# Patient Record
Sex: MALE | Race: BLACK OR AFRICAN AMERICAN | NOT HISPANIC OR LATINO | Employment: FULL TIME | URBAN - METROPOLITAN AREA
[De-identification: names, ages, dates, MRNs, and addresses within clinical notes are randomized per-mention and may not be internally consistent; named-entity substitution may affect disease eponyms.]

---

## 2019-04-21 ENCOUNTER — HOSPITAL ENCOUNTER (EMERGENCY)
Facility: HOSPITAL | Age: 24
Discharge: HOME | End: 2019-04-21
Attending: EMERGENCY MEDICINE | Admitting: EMERGENCY MEDICINE

## 2019-04-21 VITALS
WEIGHT: 300 LBS | TEMPERATURE: 99.1 F | DIASTOLIC BLOOD PRESSURE: 102 MMHG | RESPIRATION RATE: 18 BRPM | OXYGEN SATURATION: 97 % | HEIGHT: 75 IN | SYSTOLIC BLOOD PRESSURE: 156 MMHG | BODY MASS INDEX: 37.3 KG/M2 | HEART RATE: 103 BPM

## 2019-04-21 DIAGNOSIS — R11.2 NAUSEA AND VOMITING, INTRACTABILITY OF VOMITING NOT SPECIFIED, UNSPECIFIED VOMITING TYPE: Primary | ICD-10-CM

## 2019-04-21 DIAGNOSIS — R19.7 DIARRHEA, UNSPECIFIED TYPE: ICD-10-CM

## 2019-04-21 LAB
ALBUMIN SERPL-MCNC: 4 G/DL (ref 3.5–5.2)
ALBUMIN/GLOB SERPL: 1.1 G/DL
ALP SERPL-CCNC: 104 U/L (ref 39–117)
ALT SERPL W P-5'-P-CCNC: 27 U/L (ref 1–41)
AMPHET+METHAMPHET UR QL: NEGATIVE
AMPHETAMINES UR QL: NEGATIVE
ANION GAP SERPL CALCULATED.3IONS-SCNC: 13 MMOL/L
AST SERPL-CCNC: 24 U/L (ref 1–40)
BARBITURATES UR QL SCN: NEGATIVE
BASOPHILS # BLD AUTO: 0.01 10*3/MM3 (ref 0–0.2)
BASOPHILS NFR BLD AUTO: 0.2 % (ref 0–1.5)
BENZODIAZ UR QL SCN: NEGATIVE
BILIRUB SERPL-MCNC: 0.4 MG/DL (ref 0.2–1.2)
BILIRUB UR QL STRIP: NEGATIVE
BUN BLD-MCNC: 9 MG/DL (ref 6–20)
BUN/CREAT SERPL: 7.8 (ref 7–25)
BUPRENORPHINE SERPL-MCNC: NEGATIVE NG/ML
CALCIUM SPEC-SCNC: 9.4 MG/DL (ref 8.6–10.5)
CANNABINOIDS SERPL QL: POSITIVE
CHLORIDE SERPL-SCNC: 96 MMOL/L (ref 98–107)
CLARITY UR: CLEAR
CO2 SERPL-SCNC: 25 MMOL/L (ref 22–29)
COCAINE UR QL: NEGATIVE
COLOR UR: YELLOW
CREAT BLD-MCNC: 1.16 MG/DL (ref 0.76–1.27)
DEPRECATED RDW RBC AUTO: 41.4 FL (ref 37–54)
EOSINOPHIL # BLD AUTO: 0.02 10*3/MM3 (ref 0–0.4)
EOSINOPHIL NFR BLD AUTO: 0.3 % (ref 0.3–6.2)
ERYTHROCYTE [DISTWIDTH] IN BLOOD BY AUTOMATED COUNT: 13.2 % (ref 12.3–15.4)
GFR SERPL CREATININE-BSD FRML MDRD: 94 ML/MIN/1.73
GLOBULIN UR ELPH-MCNC: 3.8 GM/DL
GLUCOSE BLD-MCNC: 100 MG/DL (ref 65–99)
GLUCOSE UR STRIP-MCNC: NEGATIVE MG/DL
HCT VFR BLD AUTO: 46.3 % (ref 37.5–51)
HGB BLD-MCNC: 15.4 G/DL (ref 13–17.7)
HGB UR QL STRIP.AUTO: NEGATIVE
HOLD SPECIMEN: NORMAL
HOLD SPECIMEN: NORMAL
IMM GRANULOCYTES # BLD AUTO: 0.03 10*3/MM3 (ref 0–0.05)
IMM GRANULOCYTES NFR BLD AUTO: 0.5 % (ref 0–0.5)
KETONES UR QL STRIP: NEGATIVE
LEUKOCYTE ESTERASE UR QL STRIP.AUTO: NEGATIVE
LIPASE SERPL-CCNC: 102 U/L (ref 13–60)
LYMPHOCYTES # BLD AUTO: 1.06 10*3/MM3 (ref 0.7–3.1)
LYMPHOCYTES NFR BLD AUTO: 17.5 % (ref 19.6–45.3)
MCH RBC QN AUTO: 28.6 PG (ref 26.6–33)
MCHC RBC AUTO-ENTMCNC: 33.3 G/DL (ref 31.5–35.7)
MCV RBC AUTO: 86.1 FL (ref 79–97)
METHADONE UR QL SCN: NEGATIVE
MONOCYTES # BLD AUTO: 0.79 10*3/MM3 (ref 0.1–0.9)
MONOCYTES NFR BLD AUTO: 13.1 % (ref 5–12)
NEUTROPHILS # BLD AUTO: 4.16 10*3/MM3 (ref 1.7–7)
NEUTROPHILS NFR BLD AUTO: 68.9 % (ref 42.7–76)
NITRITE UR QL STRIP: NEGATIVE
OPIATES UR QL: NEGATIVE
OXYCODONE UR QL SCN: NEGATIVE
PCP UR QL SCN: NEGATIVE
PH UR STRIP.AUTO: 6 [PH] (ref 5–8)
PLATELET # BLD AUTO: 295 10*3/MM3 (ref 140–450)
PMV BLD AUTO: 9.5 FL (ref 6–12)
POTASSIUM BLD-SCNC: 4 MMOL/L (ref 3.5–5.2)
PROPOXYPH UR QL: NEGATIVE
PROT SERPL-MCNC: 7.8 G/DL (ref 6–8.5)
PROT UR QL STRIP: NEGATIVE
RBC # BLD AUTO: 5.38 10*6/MM3 (ref 4.14–5.8)
SODIUM BLD-SCNC: 134 MMOL/L (ref 136–145)
SP GR UR STRIP: 1.02 (ref 1–1.03)
TRICYCLICS UR QL SCN: NEGATIVE
UROBILINOGEN UR QL STRIP: NORMAL
WBC NRBC COR # BLD: 6.04 10*3/MM3 (ref 3.4–10.8)
WHOLE BLOOD HOLD SPECIMEN: NORMAL
WHOLE BLOOD HOLD SPECIMEN: NORMAL

## 2019-04-21 PROCEDURE — 99283 EMERGENCY DEPT VISIT LOW MDM: CPT

## 2019-04-21 PROCEDURE — 83690 ASSAY OF LIPASE: CPT

## 2019-04-21 PROCEDURE — 25010000002 ONDANSETRON PER 1 MG: Performed by: EMERGENCY MEDICINE

## 2019-04-21 PROCEDURE — 80053 COMPREHEN METABOLIC PANEL: CPT

## 2019-04-21 PROCEDURE — 80306 DRUG TEST PRSMV INSTRMNT: CPT | Performed by: PHYSICIAN ASSISTANT

## 2019-04-21 PROCEDURE — 85025 COMPLETE CBC W/AUTO DIFF WBC: CPT

## 2019-04-21 PROCEDURE — 96374 THER/PROPH/DIAG INJ IV PUSH: CPT

## 2019-04-21 PROCEDURE — 81003 URINALYSIS AUTO W/O SCOPE: CPT | Performed by: EMERGENCY MEDICINE

## 2019-04-21 RX ORDER — ONDANSETRON 2 MG/ML
4 INJECTION INTRAMUSCULAR; INTRAVENOUS ONCE
Status: COMPLETED | OUTPATIENT
Start: 2019-04-21 | End: 2019-04-21

## 2019-04-21 RX ORDER — ONDANSETRON 8 MG/1
8 TABLET, ORALLY DISINTEGRATING ORAL EVERY 8 HOURS PRN
Qty: 12 TABLET | Refills: 0 | Status: SHIPPED | OUTPATIENT
Start: 2019-04-21 | End: 2022-11-28

## 2019-04-21 RX ORDER — SODIUM CHLORIDE 0.9 % (FLUSH) 0.9 %
10 SYRINGE (ML) INJECTION AS NEEDED
Status: DISCONTINUED | OUTPATIENT
Start: 2019-04-21 | End: 2019-04-22 | Stop reason: HOSPADM

## 2019-04-21 RX ADMIN — SODIUM CHLORIDE 1000 ML: 9 INJECTION, SOLUTION INTRAVENOUS at 21:13

## 2019-04-21 RX ADMIN — ONDANSETRON 4 MG: 2 INJECTION INTRAMUSCULAR; INTRAVENOUS at 21:13

## 2022-11-28 ENCOUNTER — OFFICE VISIT (OUTPATIENT)
Dept: FAMILY MEDICINE CLINIC | Facility: CLINIC | Age: 27
End: 2022-11-28

## 2022-11-28 VITALS
HEIGHT: 78 IN | SYSTOLIC BLOOD PRESSURE: 158 MMHG | DIASTOLIC BLOOD PRESSURE: 72 MMHG | OXYGEN SATURATION: 99 % | BODY MASS INDEX: 36.45 KG/M2 | WEIGHT: 315 LBS | TEMPERATURE: 97.2 F | HEART RATE: 84 BPM

## 2022-11-28 DIAGNOSIS — I10 PRIMARY HYPERTENSION: ICD-10-CM

## 2022-11-28 DIAGNOSIS — G47.30 SLEEP APNEA, UNSPECIFIED TYPE: ICD-10-CM

## 2022-11-28 DIAGNOSIS — Z11.59 ENCOUNTER FOR HEPATITIS C SCREENING TEST FOR LOW RISK PATIENT: ICD-10-CM

## 2022-11-28 DIAGNOSIS — Z00.00 GENERAL MEDICAL EXAM: Primary | ICD-10-CM

## 2022-11-28 PROCEDURE — 2014F MENTAL STATUS ASSESS: CPT | Performed by: PHYSICIAN ASSISTANT

## 2022-11-28 PROCEDURE — 3008F BODY MASS INDEX DOCD: CPT | Performed by: PHYSICIAN ASSISTANT

## 2022-11-28 PROCEDURE — 99385 PREV VISIT NEW AGE 18-39: CPT | Performed by: PHYSICIAN ASSISTANT

## 2022-11-28 RX ORDER — AMLODIPINE BESYLATE 5 MG/1
5 TABLET ORAL DAILY
Qty: 30 TABLET | Refills: 5 | Status: SHIPPED | OUTPATIENT
Start: 2022-11-28 | End: 2023-01-18 | Stop reason: ALTCHOICE

## 2022-11-28 NOTE — PROGRESS NOTES
Subjective   Amy Silva is a 27 y.o. male  Establish Care (New patient establish care, previous pcp [facility]), Witnessed Apnea (Sleep apnea, req referral for a sleep study ), and Hypertension (Diagnosed with HTN, previously on amlodipine 5mg per pharm)      History of Present Illness  Patient presents today to establish care in our practice as a new patient and for a preventive medical visit.  Patient is here to determine screening labs and tests that are due and to determine immunization status as well.  Patient will be counseled regarding preventative medicine issues such as regular exercise and healthy diet as well.    Amy Silva, 1995, coming in to establish care in our practice for a physical.     He has not been taking his blood pressure medication because he has not been in a while. The patient has been told that he has sleep apnea for years. He states that he has decided to take it serious recently. The patient feels tired a lot but feels like he was getting a decent amount of sleep. The patient admits it has been a few years since he has had any blood work. He states that he does not wear glasses or contacts. The patient reports that his vision has not changed drastically. He does not get a lot of headaches with his blood pressure being up. The patient does not have any heart problems. He has never been told that he has asthma or any lung problems. The patient does not have a lot of trouble with sinuses, tonsils, sore throats, or sinus infections. He does not have a lot of digestive issues, bowel issues, or urinary problems. The patient drinks plenty of water.     He denies wearing glasses or contacts. The patient denies blurred vision, and denies drastic changes in his vision.     He denies cancer in his family.    The patient denies smoking cigarettes but does vape occasionally, but he is trying to stop that.      The following portions of the patient's history were reviewed and updated as  appropriate: allergies, current medications, past social history and problem list    Review of Systems   Constitutional: Positive for fatigue.   HENT: Negative.    Eyes: Negative.    Respiratory: Positive for apnea.    Cardiovascular: Negative.    Gastrointestinal: Negative.    Endocrine: Negative.    Genitourinary: Negative.    Musculoskeletal: Negative.    Skin: Negative.    Allergic/Immunologic: Negative.    Neurological: Negative.    Hematological: Negative.    Psychiatric/Behavioral: Negative.    All other systems reviewed and are negative.      Objective     Vitals:    11/28/22 1307   BP: 158/72   Pulse: 84   Temp: 97.2 °F (36.2 °C)   SpO2: 99%       Physical Exam  Vitals and nursing note reviewed.   Constitutional:       General: He is not in acute distress.     Appearance: Normal appearance. He is well-developed. He is obese. He is not ill-appearing, toxic-appearing or diaphoretic.      Comments: Obesity noted  BMI 45   Neck:      Thyroid: No thyromegaly.   Cardiovascular:      Rate and Rhythm: Normal rate and regular rhythm.      Heart sounds: Normal heart sounds. No murmur heard.  Pulmonary:      Effort: Pulmonary effort is normal. No respiratory distress.      Breath sounds: Normal breath sounds.   Abdominal:      Palpations: Abdomen is soft. There is no mass.      Tenderness: There is no abdominal tenderness.   Neurological:      Mental Status: He is alert and oriented to person, place, and time.   Psychiatric:         Mood and Affect: Mood normal.         Behavior: Behavior normal.         Thought Content: Thought content normal.         Judgment: Judgment normal.       Discussed preventative medicine issues with patient including regular exercise, healthy diet, stress reduction, adequate sleep and recommended age-appropriate screening studies.  Assessment & Plan     Diagnoses and all orders for this visit:    1. General medical exam (Primary)  -     Lipid Panel; Future  -     Basic metabolic panel;  Future  -     CBC (No Diff); Future  -     TSH; Future    2. Sleep apnea, unspecified type  -     Ambulatory Referral to Sleep Medicine    3. Primary hypertension    Other orders  -     amLODIPine (NORVASC) 5 MG tablet; Take 1 tablet by mouth Daily. For BP  Dispense: 30 tablet; Refill: 5     The patient's blood pressure is elevated today at 158 mmHg. He will restart amlodipine 5 mg daily. He will return in 2 weeks for a blood pressure check. We will refer him to sleep medicine for a sleep study. We will order labs to check his thyroid, blood count, glucose, kidney function, electrolytes, thyroid, and cholesterol.      Transcribed from ambient dictation for Kinga Dye PA-C by Deisy Blue.  11/28/22   14:53 EST    Patient or patient representative verbalized consent to the visit recording.  I have personally performed the services described in this document as transcribed by the above individual, and it is both accurate and complete.  Kinga Dye PA-C  11/29/2022  12:56 EST

## 2023-01-18 ENCOUNTER — OFFICE VISIT (OUTPATIENT)
Dept: FAMILY MEDICINE CLINIC | Facility: CLINIC | Age: 28
End: 2023-01-18
Payer: MEDICAID

## 2023-01-18 VITALS
HEIGHT: 78 IN | WEIGHT: 315 LBS | HEART RATE: 86 BPM | DIASTOLIC BLOOD PRESSURE: 92 MMHG | TEMPERATURE: 97.2 F | SYSTOLIC BLOOD PRESSURE: 166 MMHG | BODY MASS INDEX: 36.45 KG/M2 | OXYGEN SATURATION: 99 %

## 2023-01-18 DIAGNOSIS — F41.9 ANXIETY AND DEPRESSION: ICD-10-CM

## 2023-01-18 DIAGNOSIS — F32.A ANXIETY AND DEPRESSION: ICD-10-CM

## 2023-01-18 DIAGNOSIS — A60.02 HERPES GENITALIS IN MEN: ICD-10-CM

## 2023-01-18 DIAGNOSIS — I10 PRIMARY HYPERTENSION: Primary | ICD-10-CM

## 2023-01-18 PROCEDURE — 99214 OFFICE O/P EST MOD 30 MIN: CPT | Performed by: PHYSICIAN ASSISTANT

## 2023-01-18 RX ORDER — VALACYCLOVIR HYDROCHLORIDE 500 MG/1
500 TABLET, FILM COATED ORAL 2 TIMES DAILY
Qty: 10 TABLET | Refills: 11 | Status: SHIPPED | OUTPATIENT
Start: 2023-01-18 | End: 2023-03-20 | Stop reason: SDUPTHER

## 2023-01-18 RX ORDER — NIFEDIPINE 60 MG/1
60 TABLET, EXTENDED RELEASE ORAL DAILY
Qty: 30 TABLET | Refills: 1 | Status: SHIPPED | OUTPATIENT
Start: 2023-01-18 | End: 2023-02-14

## 2023-01-20 NOTE — PROGRESS NOTES
Chief Complaint  Sleeping Problem (NEW PATIENT )    Subjective     History of Present Illness:  Amy Silva is a 27 y.o. male with a history of hypertension.  He is referred by Dr. Mittal with internal medicine.  Patient has been told for some time that he has sleep apnea with witnessed episodes of apnea.  Per his questionnaire the patient reports snoring, witnessed apnea, and frequent awakening.  Typically the patient goes to bed at 12-1 AM waking at 6-7 AM on weekdays and weekends.  He estimates an average of 7 hours of sleep per night.  It takes him approximately 30 minutes to get to sleep.  Patient uses e-cigarettes, drinks alcohol 2-4 times per month, and used marijuana several years ago.  He drinks 1 cup of regular coffee daily. He ask about astral projection. He reports that he fell asleep, and when he was waking up he was trying to push himself up when he felt as though he saw himself in bed (such as a type of out of body experience).    Further details are as follows:    Boerne Scale is (out of 24): Total score: 4     Estimated average amount of sleep per night: 7  Number of times he wakes up at night: 3  Difficulty falling back asleep: yes  It usually takes 15-30 minutes to go to sleep.  He feels sleepy upon waking up: occasionally  Rotating or night shift work: no    Drowsiness/Sleepiness:  He exhibits the following:  excessive daytime sleepiness  excessive daytime fatigue    Snoring/Breathing:  He exhibits the following:  loud snoring, snores in all sleep positions, quits breathing at night and awakens with dry mouth    Head Injury:  He exhibits the following:  No    Reflux:  He describes the following:  None    Narcolepsy:  He exhibits the following:  feeling of paralysis while going to sleep or coming out of sleep-once a few weeks ago.    RLS/PLMs:  He describes the following:  none    Insomnia:  He describes the following:  frequent awakenings    Parasomnia:  He exhibits the  "following:  None    Weight:       01/23/23  0921   Weight: (!) 181 kg (398 lb)      Weight change in the last year:  consistent    The patient's relevant past medical, surgical, family, and social history reviewed and updated in Epic as appropriate.    Review of Systems   Constitutional: Negative.    HENT: Negative.    Eyes: Negative.    Respiratory: Positive for apnea.    Cardiovascular: Negative.    Gastrointestinal: Negative.    Endocrine: Negative.    Genitourinary: Negative.    Musculoskeletal: Positive for back pain.   Skin: Negative.    Allergic/Immunologic: Negative.    Neurological: Negative.    Hematological: Negative.    Psychiatric/Behavioral: Negative.    All other systems reviewed and are negative.      PMH:    Past Medical History:   Diagnosis Date   • Hypertension    • Sleep apnea      Past Surgical History:   Procedure Laterality Date   • CHEST TUBE INSERTION         No Known Allergies    MEDS:  Prior to Admission medications    Medication Sig Start Date End Date Taking? Authorizing Provider   NIFEdipine XL (Procardia XL) 60 MG 24 hr tablet Take 1 tablet by mouth Daily. 1/18/23   Kinga Dye PA-C   sertraline (Zoloft) 50 MG tablet Take 1 tablet by mouth Daily. For depression and anxiety 1/18/23   Kinga Dye PA-C   valACYclovir (Valtrex) 500 MG tablet Take 1 tablet by mouth 2 (Two) Times a Day. As needed for outbreaks 1/18/23   Kinga Dye PA-C       FH:  History reviewed. No pertinent family history.    Objective   Vital Signs:  /90   Pulse 111   Temp 98.7 °F (37.1 °C) (Infrared)   Ht 198.1 cm (78\")   Wt (!) 181 kg (398 lb)   SpO2 94% Comment: RESTING ROOM AIR  BMI 45.99 kg/m²     Class 3 Severe Obesity (BMI >=40). Obesity-related health conditions include the following: obstructive sleep apnea. Obesity is improving with lifestyle modifications. BMI is is above average; BMI management plan is completed. We discussed portion control and increasing " exercise.        Physical Exam  Vitals reviewed.   Constitutional:       Appearance: Normal appearance.   HENT:      Head: Normocephalic and atraumatic.      Nose: Nose normal.      Mouth/Throat:      Mouth: Mucous membranes are moist.   Cardiovascular:      Rate and Rhythm: Normal rate and regular rhythm.      Heart sounds: No murmur heard.    No friction rub. No gallop.   Pulmonary:      Effort: Pulmonary effort is normal. No respiratory distress.      Breath sounds: Normal breath sounds. No wheezing or rhonchi.   Neurological:      Mental Status: He is alert and oriented to person, place, and time.   Psychiatric:         Behavior: Behavior normal.       Mallampati Score: III (soft and hard palate and base of uvula visible)    Result Review :              Assessment and Plan  Amy Silva is a 27 y.o. male who presents for further evaluation of excessive daytime and sleepiness and fatigue, nonrestorative sleep, and concerns for sleep disordered breathing and obstructive sleep apnea.  We will obtain a home sleep test for further evaluation.  The patient will return for follow-up and recommendations after test.  I have discussed weight loss as it pertains to obstructive sleep apnea.    Diagnoses and all orders for this visit:    1. Snoring (Primary)  -     Home Sleep Study; Future    2. Suspected sleep apnea  -     Home Sleep Study; Future    3. Excessive daytime sleepiness  -     Home Sleep Study; Future                 I discussed the consequences of uncontrolled sleep apnea including hypertension, heart disease, diabetes, stroke, and dementia. I further discussed sleep apnea therapeutic options including CPAP, Weight loss, Oral dental appliance, and surgery.    I spent 33 minutes caring for Amy on this date of service. This time includes time spent by me in the following activities: preparing for the visit, obtaining and/or reviewing a separately obtained history, performing a medically appropriate examination  and/or evaluation, counseling and educating the patient/family/caregiver, ordering medications, tests, or procedures and documenting information in the medical record.      Follow Up  Return for Follow up after study.  Patient was given instructions and counseling regarding his condition or for health maintenance advice. Please see specific information pulled into the AVS if appropriate.     NABIL Scott, ACNP-BC  Pulmonology, Critical Care, and Sleep Medicine

## 2023-01-23 ENCOUNTER — OFFICE VISIT (OUTPATIENT)
Dept: SLEEP MEDICINE | Facility: CLINIC | Age: 28
End: 2023-01-23
Payer: MEDICAID

## 2023-01-23 VITALS
SYSTOLIC BLOOD PRESSURE: 148 MMHG | DIASTOLIC BLOOD PRESSURE: 90 MMHG | HEIGHT: 78 IN | TEMPERATURE: 98.7 F | BODY MASS INDEX: 36.45 KG/M2 | OXYGEN SATURATION: 94 % | HEART RATE: 111 BPM | WEIGHT: 315 LBS

## 2023-01-23 DIAGNOSIS — R06.83 SNORING: Primary | ICD-10-CM

## 2023-01-23 DIAGNOSIS — R29.818 SUSPECTED SLEEP APNEA: ICD-10-CM

## 2023-01-23 DIAGNOSIS — G47.19 EXCESSIVE DAYTIME SLEEPINESS: ICD-10-CM

## 2023-01-23 PROCEDURE — 99203 OFFICE O/P NEW LOW 30 MIN: CPT | Performed by: NURSE PRACTITIONER

## 2023-02-14 RX ORDER — NIFEDIPINE 60 MG/1
TABLET, EXTENDED RELEASE ORAL
Qty: 30 TABLET | Refills: 1 | Status: SHIPPED | OUTPATIENT
Start: 2023-02-14 | End: 2023-03-01 | Stop reason: DRUGHIGH

## 2023-03-01 ENCOUNTER — OFFICE VISIT (OUTPATIENT)
Dept: FAMILY MEDICINE CLINIC | Facility: CLINIC | Age: 28
End: 2023-03-01
Payer: MEDICAID

## 2023-03-01 VITALS
HEIGHT: 78 IN | OXYGEN SATURATION: 99 % | SYSTOLIC BLOOD PRESSURE: 162 MMHG | WEIGHT: 315 LBS | HEART RATE: 110 BPM | DIASTOLIC BLOOD PRESSURE: 90 MMHG | BODY MASS INDEX: 36.45 KG/M2 | TEMPERATURE: 97.9 F

## 2023-03-01 DIAGNOSIS — F32.A ANXIETY AND DEPRESSION: ICD-10-CM

## 2023-03-01 DIAGNOSIS — F41.9 ANXIETY AND DEPRESSION: ICD-10-CM

## 2023-03-01 DIAGNOSIS — I10 PRIMARY HYPERTENSION: Primary | ICD-10-CM

## 2023-03-01 PROCEDURE — 99213 OFFICE O/P EST LOW 20 MIN: CPT | Performed by: PHYSICIAN ASSISTANT

## 2023-03-01 RX ORDER — NIFEDIPINE 90 MG/1
90 TABLET, EXTENDED RELEASE ORAL DAILY
Qty: 30 TABLET | Refills: 1 | Status: SHIPPED | OUTPATIENT
Start: 2023-03-01 | End: 2023-03-20 | Stop reason: SDUPTHER

## 2023-03-01 NOTE — PROGRESS NOTES
Subjective   Amy Silva is a 27 y.o. male  Anxiety (Follow up on anxiety/depression, doing well on the medication ) and Hypertension (Follow up on blood pressure)      History of Present Illness    The patient is a 27-year-old male, coming in today for follow-up on hypertension, as well as anxiety and depression. Please see previous office notes from 01/23/2023.    The patient had a rough day yesterday, 02/28/2023; his blood pressure was a little high. He was really stressed out yesterday, 02/31/2023, and had some family issues. The patient denies any side effects from the blood pressure medication. He has not checked his blood pressure since his last visit. The patient denies any angina or palpitations. He had a headache yesterday, 02/28/2023.    Zoloft is helping some; his mind has been clearer, but has been racing.    He was contacted by sleep medicine and is waiting for an appointment to do the study.    The following portions of the patient's history were reviewed and updated as appropriate: allergies, current medications, past social history and problem list    Review of Systems   Constitutional: Negative for appetite change, fatigue and unexpected weight change.   Respiratory: Negative for cough, chest tightness and shortness of breath.    Cardiovascular: Negative for chest pain, palpitations and leg swelling.   Gastrointestinal: Negative for abdominal pain, diarrhea and nausea.   Skin: Negative for color change and rash.   Neurological: Negative for dizziness, tremors, syncope, weakness, light-headedness and headaches.   Psychiatric/Behavioral: Positive for dysphoric mood. Negative for agitation, behavioral problems, confusion, decreased concentration, hallucinations, self-injury, sleep disturbance and suicidal ideas. The patient is nervous/anxious. The patient is not hyperactive.         Improved on zoloft       Objective     Vitals:    03/01/23 0913   BP: 162/90   Pulse: 110   Temp: 97.9 °F (36.6 °C)    SpO2: 99%       Physical Exam  Vitals and nursing note reviewed.   Constitutional:       General: He is not in acute distress.     Appearance: Normal appearance. He is well-developed. He is obese. He is not ill-appearing, toxic-appearing or diaphoretic.      Comments: BMI45   Neck:      Vascular: No carotid bruit or JVD.   Cardiovascular:      Rate and Rhythm: Normal rate and regular rhythm.      Pulses: Normal pulses.      Heart sounds: Normal heart sounds. No murmur heard.  Pulmonary:      Effort: Pulmonary effort is normal. No respiratory distress.      Breath sounds: Normal breath sounds.   Abdominal:      Palpations: Abdomen is soft.      Tenderness: There is no abdominal tenderness.   Skin:     General: Skin is warm and dry.   Neurological:      Mental Status: He is alert and oriented to person, place, and time.   Psychiatric:         Mood and Affect: Mood normal.         Behavior: Behavior normal.         Thought Content: Thought content normal.         Judgment: Judgment normal.         Assessment & Plan     Diagnoses and all orders for this visit:    1. Primary hypertension (Primary)    2. Anxiety and depression    Other orders  -     sertraline (Zoloft) 50 MG tablet; Take 1 tablet by mouth Daily. For depression and anxiety  Dispense: 30 tablet; Refill: 11  -     NIFEdipine XL (PROCARDIA XL) 90 MG 24 hr tablet; Take 1 tablet by mouth Daily. For BP, new dose  Dispense: 30 tablet; Refill: 1    1. Hypertension  - We will increase the patient's blood pressure medication to 90 mg daily.  - He will continue to monitor his blood pressure at home once a week.Fu in office in one month    2. Anxiety and depression  - The patient will continue to take Zoloft as prescribed.    3. Herpes simplex  - The patient will continue to take valacyclovir as needed.    Answers for HPI/ROS submitted by the patient on 2/23/2023  What is the primary reason for your visit?: Other  Please describe your symptoms.: Check up  Have you  had these symptoms before?: Yes  How long have you been having these symptoms?: 1-4 days    Transcribed from ambient dictation for Kinga Dye PA-C by Deisy Blue.  03/01/23   10:46 EST    Patient or patient representative verbalized consent to the visit recording.  I have personally performed the services described in this document as transcribed by the above individual, and it is both accurate and complete.  Kinga Dye PA-C  3/1/2023  12:56 EST

## 2023-03-20 RX ORDER — VALACYCLOVIR HYDROCHLORIDE 500 MG/1
500 TABLET, FILM COATED ORAL 2 TIMES DAILY
Qty: 10 TABLET | Refills: 11 | Status: SHIPPED | OUTPATIENT
Start: 2023-03-20

## 2023-03-20 RX ORDER — NIFEDIPINE 90 MG/1
90 TABLET, EXTENDED RELEASE ORAL DAILY
Qty: 30 TABLET | Refills: 1 | Status: SHIPPED | OUTPATIENT
Start: 2023-03-20

## 2023-05-19 ENCOUNTER — OFFICE VISIT (OUTPATIENT)
Dept: FAMILY MEDICINE CLINIC | Facility: CLINIC | Age: 28
End: 2023-05-19
Payer: MEDICAID

## 2023-05-19 VITALS
SYSTOLIC BLOOD PRESSURE: 168 MMHG | WEIGHT: 315 LBS | BODY MASS INDEX: 36.45 KG/M2 | TEMPERATURE: 98 F | OXYGEN SATURATION: 96 % | DIASTOLIC BLOOD PRESSURE: 98 MMHG | HEIGHT: 78 IN | HEART RATE: 99 BPM

## 2023-05-19 DIAGNOSIS — E66.01 CLASS 3 SEVERE OBESITY WITH SERIOUS COMORBIDITY AND BODY MASS INDEX (BMI) OF 40.0 TO 44.9 IN ADULT, UNSPECIFIED OBESITY TYPE: ICD-10-CM

## 2023-05-19 DIAGNOSIS — R20.0 NUMBNESS OF RIGHT THUMB: ICD-10-CM

## 2023-05-19 DIAGNOSIS — J06.9 ACUTE URI: ICD-10-CM

## 2023-05-19 DIAGNOSIS — I10 PRIMARY HYPERTENSION: Primary | ICD-10-CM

## 2023-05-19 PROCEDURE — 1159F MED LIST DOCD IN RCRD: CPT | Performed by: PHYSICIAN ASSISTANT

## 2023-05-19 PROCEDURE — 99214 OFFICE O/P EST MOD 30 MIN: CPT | Performed by: PHYSICIAN ASSISTANT

## 2023-05-19 PROCEDURE — 1160F RVW MEDS BY RX/DR IN RCRD: CPT | Performed by: PHYSICIAN ASSISTANT

## 2023-05-19 RX ORDER — NIFEDIPINE 90 MG/1
90 TABLET, EXTENDED RELEASE ORAL DAILY
Qty: 30 TABLET | Refills: 1 | Status: SHIPPED | OUTPATIENT
Start: 2023-05-19

## 2023-05-19 RX ORDER — LEVOCETIRIZINE DIHYDROCHLORIDE 5 MG/1
5 TABLET, FILM COATED ORAL EVERY EVENING
Qty: 15 TABLET | Refills: 0 | Status: SHIPPED | OUTPATIENT
Start: 2023-05-19

## 2023-05-19 RX ORDER — AMOXICILLIN 500 MG/1
500 CAPSULE ORAL 2 TIMES DAILY
Qty: 14 CAPSULE | Refills: 0 | Status: SHIPPED | OUTPATIENT
Start: 2023-05-19

## 2023-05-19 RX ORDER — DEXTROMETHORPHAN HYDROBROMIDE AND PROMETHAZINE HYDROCHLORIDE 15; 6.25 MG/5ML; MG/5ML
5 SYRUP ORAL 4 TIMES DAILY PRN
Qty: 180 ML | Refills: 0 | Status: SHIPPED | OUTPATIENT
Start: 2023-05-19

## 2023-05-19 NOTE — PROGRESS NOTES
Subjective   Amy Silva is a 27 y.o. male  Hypertension (Here for 2 mo f/u), Apnea (NEEDS TO REDO), and URI (FEELS LIKE HE MIGHT HAVE A URI)      History of Present Illness    The patient is a 27-year-old male seen today for follow-up on hypertension, apnea, and new onset symptoms of congestion, cough, and cold.    The patient reports that he has been out of his hypertension medication for a few days. He has not checked his blood pressure readings while he is on his medication. The patient has been a little stressed out the last couple of days. He last took his blood pressure medication on Monday, 05/15/2022. The patient denies having a blood pressure cuff at home.    The patient had a sleep study performed last week. He was told that the result was insufficient data and recording time.    The patient feels like he has an upper respiratory infection. The patient reports that it is hard to breathe through his nose. He has been coughing up dark mucus. The patient reports that he is experiencing a scratchy throat when he wakes up. He denies any fever. The patient denies any allergies. He denies any pneumonia or bronchitis. The patient reports that he is not allergic to any antibiotics.    The patient reports that his aunt is on Ozempic, and it is really helping her to lose weight. He is aware that he needs to lose weight. The patient reports that he has not had blood work performed in a long time. He states that he has never been told that he has high blood sugar in the past. The patient denies any family history of diabetes.    The patient reports that he has been experiencing numbness in his right thumb for a few days. He denies any injury or trauma. The patient denies any swelling. 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    The following portions of the patient's history were reviewed and updated as appropriate: allergies, current medications, past social history and problem list    Review of Systems    Constitutional: Negative for fatigue and unexpected weight change.   HENT: Positive for congestion.    Respiratory: Positive for apnea and cough. Negative for chest tightness and shortness of breath.    Cardiovascular: Negative for chest pain, palpitations and leg swelling.   Gastrointestinal: Negative for nausea.   Skin: Negative for color change and rash.   Neurological: Positive for numbness. Negative for dizziness, syncope, weakness and headaches.       Objective     Vitals:    05/19/23 1145   BP: 168/98   Pulse: 99   Temp: 98 °F (36.7 °C)   SpO2: 96%       Physical Exam  Vitals and nursing note reviewed.   Constitutional:       General: He is not in acute distress.     Appearance: Normal appearance. He is well-developed. He is obese. He is not ill-appearing, toxic-appearing or diaphoretic.      Comments: BMI45   Neck:      Vascular: No carotid bruit or JVD.   Cardiovascular:      Rate and Rhythm: Normal rate and regular rhythm.      Pulses: Normal pulses.      Heart sounds: Normal heart sounds. No murmur heard.  Pulmonary:      Effort: Pulmonary effort is normal. No respiratory distress.      Breath sounds: Normal breath sounds.   Abdominal:      Palpations: Abdomen is soft.      Tenderness: There is no abdominal tenderness.   Musculoskeletal:         General: No swelling or tenderness. Normal range of motion.   Skin:     General: Skin is warm and dry.      Findings: No rash.   Neurological:      Mental Status: He is alert.      Sensory: No sensory deficit.         Assessment & Plan     Diagnoses and all orders for this visit:    1. Primary hypertension (Primary)  -     Hemoglobin A1c; Future  -     Basic metabolic panel; Future  -     TSH; Future    2. Class 3 severe obesity with serious comorbidity and body mass index (BMI) of 40.0 to 44.9 in adult, unspecified obesity type  -     Hemoglobin A1c; Future  -     Basic metabolic panel; Future  -     TSH; Future    3. Acute URI    4. Numbness of right  thumb    Other orders  -     NIFEdipine XL (PROCARDIA XL) 90 MG 24 hr tablet; Take 1 tablet by mouth Daily. For BP, new dose  Dispense: 30 tablet; Refill: 1  -     amoxicillin (AMOXIL) 500 MG capsule; Take 1 capsule by mouth 2 (Two) Times a Day.  Dispense: 14 capsule; Refill: 0  -     promethazine-dextromethorphan (PROMETHAZINE-DM) 6.25-15 MG/5ML syrup; Take 5 mL by mouth 4 (Four) Times a Day As Needed for Cough.  Dispense: 180 mL; Refill: 0  -     levocetirizine (XYZAL) 5 MG tablet; Take 1 tablet by mouth Every Evening. FOR CONGESTION  Dispense: 15 tablet; Refill: 0     1. Hypertension  The patient will begin taking his blood pressure medication again.  He will purchase a blood pressure cuff and begin monitoring his blood pressure at home.  He will call/message or return in a week with results if medication is working effectively.    2. Sleep apnea  The patient will contact [facility].    3. Upper respiratory infection  The patient will be prescribed an antibiotic, congestion medication, and cough syrup.    4. Right thumb numbness  The patient will apply a cool compress to the area twice daily.    Transcribed from ambient dictation for Kinga Dye PA-C by Vanna Collazo.  05/19/23   14:27 EDT    Patient or patient representative verbalized consent to the visit recording.  I have personally performed the services described in this document as transcribed by the above individual, and it is both accurate and complete.  Kinga Dye PA-C  5/19/2023  15:24 EDT

## 2023-05-26 RX ORDER — BENZONATATE 200 MG/1
200 CAPSULE ORAL 3 TIMES DAILY PRN
Qty: 21 CAPSULE | Refills: 0 | Status: SHIPPED | OUTPATIENT
Start: 2023-05-26

## 2023-05-30 DIAGNOSIS — R29.818 SUSPECTED SLEEP APNEA: ICD-10-CM

## 2023-05-30 DIAGNOSIS — G47.19 EXCESSIVE DAYTIME SLEEPINESS: ICD-10-CM

## 2023-05-30 DIAGNOSIS — R06.83 SNORING: Primary | ICD-10-CM

## 2023-05-30 RX ORDER — LEVOCETIRIZINE DIHYDROCHLORIDE 5 MG/1
5 TABLET, FILM COATED ORAL EVERY EVENING
Qty: 30 TABLET | Refills: 5 | Status: SHIPPED | OUTPATIENT
Start: 2023-05-30

## 2023-06-12 RX ORDER — NIFEDIPINE 90 MG/1
TABLET, EXTENDED RELEASE ORAL
Qty: 30 TABLET | Refills: 5 | Status: SHIPPED | OUTPATIENT
Start: 2023-06-12

## 2023-08-04 RX ORDER — VALACYCLOVIR HYDROCHLORIDE 500 MG/1
500 TABLET, FILM COATED ORAL 2 TIMES DAILY
Qty: 10 TABLET | Refills: 11 | Status: SHIPPED | OUTPATIENT
Start: 2023-08-04

## 2023-08-04 RX ORDER — NIFEDIPINE 90 MG/1
90 TABLET, EXTENDED RELEASE ORAL DAILY
Qty: 30 TABLET | Refills: 5 | Status: SHIPPED | OUTPATIENT
Start: 2023-08-04

## 2023-11-20 RX ORDER — VALACYCLOVIR HYDROCHLORIDE 500 MG/1
500 TABLET, FILM COATED ORAL 2 TIMES DAILY
Qty: 10 TABLET | Refills: 11 | Status: SHIPPED | OUTPATIENT
Start: 2023-11-20

## 2024-01-03 RX ORDER — VALACYCLOVIR HYDROCHLORIDE 500 MG/1
500 TABLET, FILM COATED ORAL 2 TIMES DAILY
Qty: 10 TABLET | Refills: 11 | Status: SHIPPED | OUTPATIENT
Start: 2024-01-03

## 2024-01-03 RX ORDER — NIFEDIPINE 90 MG/1
90 TABLET, EXTENDED RELEASE ORAL DAILY
Qty: 30 TABLET | Refills: 5 | Status: SHIPPED | OUTPATIENT
Start: 2024-01-03

## 2024-01-16 ENCOUNTER — OFFICE VISIT (OUTPATIENT)
Dept: FAMILY MEDICINE CLINIC | Facility: CLINIC | Age: 29
End: 2024-01-16
Payer: MEDICAID

## 2024-01-16 VITALS
HEIGHT: 78 IN | OXYGEN SATURATION: 98 % | TEMPERATURE: 97.8 F | SYSTOLIC BLOOD PRESSURE: 158 MMHG | DIASTOLIC BLOOD PRESSURE: 90 MMHG | BODY MASS INDEX: 36.45 KG/M2 | WEIGHT: 315 LBS | HEART RATE: 84 BPM

## 2024-01-16 DIAGNOSIS — G44.52 NEW DAILY PERSISTENT HEADACHE: ICD-10-CM

## 2024-01-16 DIAGNOSIS — Z00.00 GENERAL MEDICAL EXAM: Primary | ICD-10-CM

## 2024-01-16 DIAGNOSIS — I10 PRIMARY HYPERTENSION: ICD-10-CM

## 2024-01-16 DIAGNOSIS — G47.39 SLEEP APNEA-LIKE BEHAVIOR: ICD-10-CM

## 2024-01-16 PROCEDURE — 1159F MED LIST DOCD IN RCRD: CPT | Performed by: PHYSICIAN ASSISTANT

## 2024-01-16 PROCEDURE — 99395 PREV VISIT EST AGE 18-39: CPT | Performed by: PHYSICIAN ASSISTANT

## 2024-01-16 PROCEDURE — 1160F RVW MEDS BY RX/DR IN RCRD: CPT | Performed by: PHYSICIAN ASSISTANT

## 2024-01-16 RX ORDER — NIFEDIPINE 90 MG/1
90 TABLET, EXTENDED RELEASE ORAL DAILY
Qty: 30 TABLET | Refills: 5 | Status: SHIPPED | OUTPATIENT
Start: 2024-01-16

## 2024-01-16 RX ORDER — DOXAZOSIN MESYLATE 1 MG/1
1 TABLET ORAL NIGHTLY
Qty: 30 TABLET | Refills: 5 | Status: SHIPPED | OUTPATIENT
Start: 2024-01-16

## 2024-01-16 NOTE — PROGRESS NOTES
Subjective   Amy Silva is a 28 y.o. male  Annual Exam (Annual physical, not fasting), Obesity (Discuss weight loss options ), and Hypertension (Follow up on BP, often getting headaches in the back of head )      History of Present Illness  Patient presents today for a preventive medical visit.  Patient is here to determine screening labs and tests that are due and to determine immunization status as well.  Patient will be counseled regarding preventative medicine issues such as regular exercise and healthy diet as well.  The following portions of the patient's history were reviewed and updated as appropriate: allergies, current medications, past social history and problem list    Review of Systems   Constitutional: Negative.  Negative for fatigue and unexpected weight change.   HENT:  Negative for congestion, dental problem, postnasal drip, sinus pressure and sore throat.    Eyes: Negative.  Negative for photophobia, pain and visual disturbance.   Respiratory: Negative.     Cardiovascular: Negative.    Gastrointestinal: Negative.  Negative for nausea and vomiting.   Endocrine: Negative.    Genitourinary: Negative.    Musculoskeletal: Negative.    Skin: Negative.    Allergic/Immunologic: Negative.    Neurological:  Positive for headaches. Negative for dizziness, syncope, facial asymmetry, speech difficulty, weakness, light-headedness and numbness.   Hematological: Negative.    Psychiatric/Behavioral: Negative.  Negative for agitation, confusion, dysphoric mood and sleep disturbance. The patient is not nervous/anxious.    All other systems reviewed and are negative.      Objective     Vitals:    01/16/24 0933   BP: 158/90   Pulse: 84   Temp: 97.8 °F (36.6 °C)   SpO2: 98%       Physical Exam  Vitals and nursing note reviewed.   Constitutional:       General: He is not in acute distress.     Appearance: Normal appearance. He is well-developed. He is obese. He is not ill-appearing, toxic-appearing or diaphoretic.       Comments: BMI44   HENT:      Head: Normocephalic and atraumatic.      Right Ear: External ear normal.      Left Ear: External ear normal.   Eyes:      Conjunctiva/sclera: Conjunctivae normal.      Pupils: Pupils are equal, round, and reactive to light.   Neck:      Thyroid: No thyromegaly.      Vascular: No carotid bruit.   Cardiovascular:      Rate and Rhythm: Normal rate and regular rhythm.      Pulses: Normal pulses.      Heart sounds: Normal heart sounds. No murmur heard.  Pulmonary:      Effort: Pulmonary effort is normal. No respiratory distress.      Breath sounds: Normal breath sounds.   Abdominal:      General: Bowel sounds are normal.      Palpations: Abdomen is soft. There is no mass.      Tenderness: There is no abdominal tenderness.   Musculoskeletal:         General: No swelling. Normal range of motion.      Cervical back: Normal range of motion and neck supple.   Lymphadenopathy:      Cervical: No cervical adenopathy.   Skin:     General: Skin is warm and dry.      Findings: No lesion or rash.   Neurological:      Mental Status: He is alert and oriented to person, place, and time.      Cranial Nerves: No cranial nerve deficit.      Sensory: No sensory deficit.      Motor: No weakness.      Coordination: Coordination normal.      Gait: Gait normal.      Deep Tendon Reflexes: Reflexes are normal and symmetric.   Psychiatric:         Mood and Affect: Mood normal.         Behavior: Behavior normal.         Thought Content: Thought content normal.         Judgment: Judgment normal.       Discussed preventative medicine issues with patient including regular exercise, healthy diet, stress reduction, adequate sleep and recommended age-appropriate screening studies.  Assessment & Plan     Diagnoses and all orders for this visit:    1. General medical exam (Primary)  -     Hepatitis C Antibody; Future  -     CBC (No Diff); Future  -     Comprehensive metabolic panel; Future  -     Lipid Panel; Future  -      TSH; Future    2. Primary hypertension  -     Hepatitis C Antibody; Future  -     CBC (No Diff); Future  -     Comprehensive metabolic panel; Future  -     Lipid Panel; Future  -     TSH; Future    3. New daily persistent headache  -     Hepatitis C Antibody; Future  -     CBC (No Diff); Future  -     Comprehensive metabolic panel; Future  -     Lipid Panel; Future  -     TSH; Future    4. Sleep apnea-like behavior    Other orders  -     doxazosin (Cardura) 1 MG tablet; Take 1 tablet by mouth Every Night. For BP  Dispense: 30 tablet; Refill: 5  -     NIFEdipine XL (PROCARDIA XL) 90 MG 24 hr tablet; Take 1 tablet by mouth Daily. for blood pressure  Dispense: 30 tablet; Refill: 5

## 2024-01-16 NOTE — PROGRESS NOTES
Subjective   Amy Silva is a 28 y.o. male      Amy Silva, date of birth 1995, presents today for follow-up on headaches.    He conveys that he is having headaches. His blood pressure today 2024, is 158/90 mmHg. He was assessed for sleep apnea, and it was inconclusive. He was supposed to do a sleep lab study, but he had to cancel because he had to attend a .     He has lost 17 pounds since 2023. He has been trying to eat better. He has never seen a dietitian before. He denies any trouble urinating or blood in his urine. He denies any digestion, bowel issues, vision changes, or any swelling. He conveys that he is drinking plenty of water. He drinks coffee with a sugar free creamer. His diet consists of meat, vegetables, and fruit. He has been slowing down on bread and pasta. He conveys that he is doing well on the sertraline for his mood.    He denies any family history of diabetes or dialysis.         Annual Exam (Annual physical, not fasting), Obesity (Discuss weight loss options ), and Hypertension (Follow up on BP, often getting headaches in the back of head )      Obesity  Associated symptoms include headaches. Pertinent negatives include no congestion, fatigue, nausea, numbness, sore throat, vomiting or weakness.   Hypertension  Associated symptoms include headaches.     Patient presents today for a preventive medical visit.  Patient is here to determine screening labs and tests that are due and to determine immunization status as well.  Patient will be counseled regarding preventative medicine issues such as regular exercise and healthy diet as well.  The following portions of the patient's history were reviewed and updated as appropriate: allergies, current medications, past social history and problem list    Review of Systems   Constitutional: Negative.  Negative for fatigue and unexpected weight change.   HENT:  Negative for congestion, dental problem, postnasal drip, sinus  pressure and sore throat.    Eyes: Negative.  Negative for photophobia, pain and visual disturbance.   Respiratory: Negative.     Cardiovascular: Negative.    Gastrointestinal: Negative.  Negative for nausea and vomiting.   Endocrine: Negative.    Genitourinary: Negative.    Musculoskeletal: Negative.    Skin: Negative.    Allergic/Immunologic: Negative.    Neurological:  Positive for headaches. Negative for dizziness, syncope, facial asymmetry, speech difficulty, weakness, light-headedness and numbness.   Hematological: Negative.    Psychiatric/Behavioral: Negative.  Negative for agitation, confusion, dysphoric mood and sleep disturbance. The patient is not nervous/anxious.    All other systems reviewed and are negative.      Objective     Vitals:    01/16/24 0933   BP: 158/90   Pulse: 84   Temp: 97.8 °F (36.6 °C)   SpO2: 98%       Physical Exam  Vitals and nursing note reviewed.   Constitutional:       General: He is not in acute distress.     Appearance: Normal appearance. He is well-developed. He is obese. He is not ill-appearing, toxic-appearing or diaphoretic.      Comments: BMI44   HENT:      Head: Normocephalic and atraumatic.      Right Ear: External ear normal.      Left Ear: External ear normal.   Eyes:      Conjunctiva/sclera: Conjunctivae normal.      Pupils: Pupils are equal, round, and reactive to light.   Neck:      Thyroid: No thyromegaly.      Vascular: No carotid bruit.   Cardiovascular:      Rate and Rhythm: Normal rate and regular rhythm.      Pulses: Normal pulses.      Heart sounds: Normal heart sounds. No murmur heard.  Pulmonary:      Effort: Pulmonary effort is normal. No respiratory distress.      Breath sounds: Normal breath sounds.   Abdominal:      General: Bowel sounds are normal.      Palpations: Abdomen is soft. There is no mass.      Tenderness: There is no abdominal tenderness.   Musculoskeletal:         General: No swelling. Normal range of motion.      Cervical back: Normal range  of motion and neck supple.   Lymphadenopathy:      Cervical: No cervical adenopathy.   Skin:     General: Skin is warm and dry.      Findings: No lesion or rash.   Neurological:      Mental Status: He is alert and oriented to person, place, and time.      Cranial Nerves: No cranial nerve deficit.      Sensory: No sensory deficit.      Motor: No weakness.      Coordination: Coordination normal.      Gait: Gait normal.      Deep Tendon Reflexes: Reflexes are normal and symmetric.   Psychiatric:         Mood and Affect: Mood normal.         Behavior: Behavior normal.         Thought Content: Thought content normal.         Judgment: Judgment normal.       Discussed preventative medicine issues with patient including regular exercise, healthy diet, stress reduction, adequate sleep and recommended age-appropriate screening studies.  Assessment & Plan     Diagnoses and all orders for this visit:    1. General medical exam (Primary)  -     Hepatitis C Antibody; Future  -     CBC (No Diff); Future  -     Comprehensive metabolic panel; Future  -     Lipid Panel; Future  -     TSH; Future    2. Primary hypertension  -     Hepatitis C Antibody; Future  -     CBC (No Diff); Future  -     Comprehensive metabolic panel; Future  -     Lipid Panel; Future  -     TSH; Future    3. New daily persistent headache  -     Hepatitis C Antibody; Future  -     CBC (No Diff); Future  -     Comprehensive metabolic panel; Future  -     Lipid Panel; Future  -     TSH; Future    4. Sleep apnea-like behavior    Other orders  -     doxazosin (Cardura) 1 MG tablet; Take 1 tablet by mouth Every Night. For BP  Dispense: 30 tablet; Refill: 5  -     NIFEdipine XL (PROCARDIA XL) 90 MG 24 hr tablet; Take 1 tablet by mouth Daily. for blood pressure  Dispense: 30 tablet; Refill: 5     1. Hypertension  - His blood pressure is 158/90 mmHg in the office today 01/16/2024.   - He will continue his current medication regimen.  - I will add doxazosin 1 mg nightly  for hypertension.    2. Sleep apnea  - Pt will fu with sleep medicine department    3. Depression  - He will continue his current medication regimen.    He will return this week for fasting blood work.      I will await referral to nutritional services until I receive his labs so as to be more specific on the referral after reviewing his glucose and renal function as well as lipids.    Transcribed from ambient dictation for Kinga Dye PA-C by Kyle Bruno.  01/16/24   10:40 EST    Patient or patient representative verbalized consent to the visit recording.  I have personally performed the services described in this document as transcribed by the above individual, and it is both accurate and complete.

## 2024-02-13 ENCOUNTER — OFFICE VISIT (OUTPATIENT)
Dept: FAMILY MEDICINE CLINIC | Facility: CLINIC | Age: 29
End: 2024-02-13
Payer: MEDICAID

## 2024-02-13 DIAGNOSIS — I10 PRIMARY HYPERTENSION: Primary | ICD-10-CM

## 2024-02-13 DIAGNOSIS — E66.01 CLASS 3 SEVERE OBESITY WITH SERIOUS COMORBIDITY AND BODY MASS INDEX (BMI) OF 40.0 TO 44.9 IN ADULT, UNSPECIFIED OBESITY TYPE: ICD-10-CM

## 2024-02-13 DIAGNOSIS — F32.A ANXIETY AND DEPRESSION: ICD-10-CM

## 2024-02-13 DIAGNOSIS — R07.89 OTHER CHEST PAIN: ICD-10-CM

## 2024-02-13 DIAGNOSIS — G47.39 SLEEP APNEA-LIKE BEHAVIOR: ICD-10-CM

## 2024-02-13 DIAGNOSIS — F41.9 ANXIETY AND DEPRESSION: ICD-10-CM

## 2024-02-13 PROCEDURE — 1159F MED LIST DOCD IN RCRD: CPT | Performed by: PHYSICIAN ASSISTANT

## 2024-02-13 PROCEDURE — 99213 OFFICE O/P EST LOW 20 MIN: CPT | Performed by: PHYSICIAN ASSISTANT

## 2024-02-13 PROCEDURE — 93000 ELECTROCARDIOGRAM COMPLETE: CPT | Performed by: PHYSICIAN ASSISTANT

## 2024-02-13 PROCEDURE — 1160F RVW MEDS BY RX/DR IN RCRD: CPT | Performed by: PHYSICIAN ASSISTANT

## 2024-03-27 ENCOUNTER — OFFICE VISIT (OUTPATIENT)
Dept: FAMILY MEDICINE CLINIC | Facility: CLINIC | Age: 29
End: 2024-03-27
Payer: MEDICAID

## 2024-03-27 VITALS
WEIGHT: 315 LBS | TEMPERATURE: 97.8 F | DIASTOLIC BLOOD PRESSURE: 74 MMHG | HEIGHT: 78 IN | SYSTOLIC BLOOD PRESSURE: 134 MMHG | BODY MASS INDEX: 36.45 KG/M2 | HEART RATE: 76 BPM | OXYGEN SATURATION: 99 %

## 2024-03-27 DIAGNOSIS — J06.9 UPPER RESPIRATORY TRACT INFECTION, UNSPECIFIED TYPE: Primary | ICD-10-CM

## 2024-03-27 DIAGNOSIS — I10 PRIMARY HYPERTENSION: ICD-10-CM

## 2024-03-27 PROCEDURE — 1160F RVW MEDS BY RX/DR IN RCRD: CPT | Performed by: PHYSICIAN ASSISTANT

## 2024-03-27 PROCEDURE — 1159F MED LIST DOCD IN RCRD: CPT | Performed by: PHYSICIAN ASSISTANT

## 2024-03-27 PROCEDURE — 99214 OFFICE O/P EST MOD 30 MIN: CPT | Performed by: PHYSICIAN ASSISTANT

## 2024-03-27 RX ORDER — DEXTROMETHORPHAN HYDROBROMIDE AND PROMETHAZINE HYDROCHLORIDE 15; 6.25 MG/5ML; MG/5ML
5 SYRUP ORAL 4 TIMES DAILY PRN
Qty: 180 ML | Refills: 0 | Status: SHIPPED | OUTPATIENT
Start: 2024-03-27

## 2024-03-27 RX ORDER — AZITHROMYCIN 250 MG/1
TABLET, FILM COATED ORAL
Qty: 6 TABLET | Refills: 0 | Status: SHIPPED | OUTPATIENT
Start: 2024-03-27

## 2024-03-27 NOTE — PROGRESS NOTES
Subjective   Amy Silva is a 28 y.o. male  Cough (Semi productive cough x2 weeks, worse at night, no other symptoms )      History of Present Illness  Amy Silva,  1995, presents today for follow-up on hypertension, but also being seen for a new problem for a cough for 2 weeks.    He is taking his blood pressure medication and it is working well. He has lost 10 pounds.    The patient feels great other than the cough. His cough is worse at night and keeps him up from sleep. He fell asleep last night for 30 to 40 minutes and kept waking up coughing. He has coughing fits during the day and it is hard to stop. He coughs up yellow phlegm sometimes. He denies any wheezing or chest heaviness. He denies any sinus congestion. He denies any fever. He denies any sneezing. He was sick 2 weeks ago with cold and flu-like symptoms. He has been working out. He denies any dizziness or lightheadedness.    He called the sleep doctor a couple of times, but they never answered. He left a message.    He is not allergic to any antibiotics.    The following portions of the patient's history were reviewed and updated as appropriate: allergies, current medications, past social history and problem list    Review of Systems   Constitutional:  Negative for fatigue and unexpected weight change.   Respiratory:  Positive for cough. Negative for chest tightness and shortness of breath.    Cardiovascular:  Negative for chest pain, palpitations and leg swelling.   Gastrointestinal:  Negative for nausea.   Skin:  Negative for color change and rash.   Neurological:  Negative for dizziness, syncope, weakness and headaches.   Psychiatric/Behavioral:  Positive for sleep disturbance.        Objective     Vitals:    24 0914   BP: 134/74   Pulse: 76   Temp: 97.8 °F (36.6 °C)   SpO2: 99%       Physical Exam  Vitals and nursing note reviewed.   Constitutional:       General: He is not in acute distress.     Appearance: Normal appearance. He is  well-developed. He is not ill-appearing, toxic-appearing or diaphoretic.      Comments: BMI43   Neck:      Vascular: No carotid bruit or JVD.   Cardiovascular:      Rate and Rhythm: Normal rate and regular rhythm.      Pulses: Normal pulses.      Heart sounds: Normal heart sounds. No murmur heard.  Pulmonary:      Effort: Pulmonary effort is normal. No respiratory distress.      Breath sounds: Normal breath sounds.   Abdominal:      Palpations: Abdomen is soft.      Tenderness: There is no abdominal tenderness.   Skin:     General: Skin is warm and dry.   Neurological:      Mental Status: He is alert.         Assessment & Plan     Diagnoses and all orders for this visit:    1. Upper respiratory tract infection, unspecified type (Primary)    2. Primary hypertension    Other orders  -     azithromycin (Zithromax Z-Mickey) 250 MG tablet; Take 2 tablets by mouth on day 1, then 1 tablet daily on days 2-5  Dispense: 6 tablet; Refill: 0  -     promethazine-dextromethorphan (PROMETHAZINE-DM) 6.25-15 MG/5ML syrup; Take 5 mL by mouth 4 (Four) Times a Day As Needed for Cough.  Dispense: 180 mL; Refill: 0       Answers submitted by the patient for this visit:  Primary Reason for Visit (Submitted on 3/26/2024)  What is the primary reason for your visit?: Cough    1. Upper respiratory infection.  His lungs are clear. He does not have bronchitis or pneumonia. It sounds like an upper respiratory infection. I will prescribe Z-Mickey once a day for 5 days. I will also prescribe a cough syrup to take at bedtime to suppress the cough.    2. Hypertension.  His blood pressure is perfect. He will continue exercising, working out, eating healthy, drinking plenty of water, and stay away from salty snacks.    3. Sleep issues.  I will send a message to the sleep doctor. If they do not reach out to him within 1 week, he will send me a message on [employer].    Follow-up  The patient will follow up in 6 months.      Transcribed from ambient dictation  for Kinga Dye PA-C by Rodney Oconnell.  03/27/24   12:17 EDT    Patient or patient representative verbalized consent to the visit recording.  I have personally performed the services described in this document as transcribed by the above individual, and it is both accurate and complete.

## 2024-04-04 ENCOUNTER — TELEPHONE (OUTPATIENT)
Dept: FAMILY MEDICINE CLINIC | Facility: CLINIC | Age: 29
End: 2024-04-04
Payer: MEDICAID

## 2024-04-04 RX ORDER — BENZONATATE 200 MG/1
200 CAPSULE ORAL 3 TIMES DAILY PRN
Qty: 30 CAPSULE | Refills: 1 | Status: SHIPPED | OUTPATIENT
Start: 2024-04-04

## 2024-04-04 NOTE — TELEPHONE ENCOUNTER
Please find out if he is short of breath if he is wheezing if his cough is productive and if he has any other symptoms with this.

## 2024-04-04 NOTE — TELEPHONE ENCOUNTER
----- Message from Amy Silva sent at 4/4/2024 11:29 AM EDT -----  Regarding: Follow up   Contact: [phone]  Christine , i am reaching because i am still coughing pretty bad. It’s not necessarily as bad as it was but it’s definitely still consistent.

## 2024-04-09 ENCOUNTER — HOSPITAL ENCOUNTER (OUTPATIENT)
Dept: SLEEP MEDICINE | Facility: HOSPITAL | Age: 29
Discharge: HOME | End: 2024-04-09
Admitting: NURSE PRACTITIONER
Payer: MEDICAID

## 2024-04-09 DIAGNOSIS — G47.19 EXCESSIVE DAYTIME SLEEPINESS: ICD-10-CM

## 2024-04-09 DIAGNOSIS — R29.818 SUSPECTED SLEEP APNEA: ICD-10-CM

## 2024-04-09 DIAGNOSIS — R06.83 SNORING: ICD-10-CM

## 2024-04-09 PROCEDURE — 95810 POLYSOM 6/> YRS 4/> PARAM: CPT

## 2024-05-28 ENCOUNTER — OFFICE VISIT (OUTPATIENT)
Dept: FAMILY MEDICINE CLINIC | Facility: CLINIC | Age: 29
End: 2024-05-28
Payer: MEDICAID

## 2024-05-28 VITALS
HEART RATE: 80 BPM | TEMPERATURE: 98.3 F | SYSTOLIC BLOOD PRESSURE: 164 MMHG | OXYGEN SATURATION: 99 % | BODY MASS INDEX: 36.45 KG/M2 | DIASTOLIC BLOOD PRESSURE: 92 MMHG | WEIGHT: 315 LBS | HEIGHT: 78 IN

## 2024-05-28 DIAGNOSIS — I10 PRIMARY HYPERTENSION: Primary | ICD-10-CM

## 2024-05-28 DIAGNOSIS — F90.0 ATTENTION DEFICIT HYPERACTIVITY DISORDER (ADHD), PREDOMINANTLY INATTENTIVE TYPE: ICD-10-CM

## 2024-05-28 DIAGNOSIS — G47.33 OSA (OBSTRUCTIVE SLEEP APNEA): ICD-10-CM

## 2024-05-28 PROCEDURE — 1126F AMNT PAIN NOTED NONE PRSNT: CPT | Performed by: PHYSICIAN ASSISTANT

## 2024-05-28 PROCEDURE — 1159F MED LIST DOCD IN RCRD: CPT | Performed by: PHYSICIAN ASSISTANT

## 2024-05-28 PROCEDURE — 1160F RVW MEDS BY RX/DR IN RCRD: CPT | Performed by: PHYSICIAN ASSISTANT

## 2024-05-28 PROCEDURE — 99213 OFFICE O/P EST LOW 20 MIN: CPT | Performed by: PHYSICIAN ASSISTANT

## 2024-05-28 NOTE — PROGRESS NOTES
Subjective   Amy Silva is a 28 y.o. male  focus issues (Concerned about focus issues and  hyperactivity, possible ADD, req ADD testing )      History of Present Illness    Amy Silva, 1995, presents for follow-up on hypertension and also to discuss some issues related to focus and concentration.    The patient expresses a desire to be evaluated for Attention Deficit Disorder (ADD) or ADHD, a condition he has been observing for the past 5 to 6 years. He reports difficulty in organizing his thoughts and time management, which he perceives as hindering his ability to raise his full potential. These symptoms have been observed by both his girlfriend and sibling.    The patient underwent a sleep study and has been diagnosed with sleep apnea. However, he has not been advised to use a CPAP machine.    The following portions of the patient's history were reviewed and updated as appropriate: allergies, current medications, past social history and problem list    Review of Systems   Constitutional:  Positive for activity change and appetite change. Negative for fatigue and unexpected weight change.   Respiratory:  Positive for apnea. Negative for chest tightness.    Cardiovascular:  Negative for chest pain and palpitations.   Psychiatric/Behavioral:  Positive for decreased concentration. Negative for agitation, behavioral problems, confusion, dysphoric mood and sleep disturbance. The patient is not nervous/anxious and is not hyperactive.        Objective     Vitals:    05/28/24 1554   BP: 164/92   Pulse: 80   Temp: 98.3 °F (36.8 °C)   SpO2: 99%       Physical Exam  Vitals and nursing note reviewed.   Constitutional:       General: He is not in acute distress.     Appearance: Normal appearance. He is well-developed. He is obese. He is not ill-appearing, toxic-appearing or diaphoretic.      Comments: [id number] noted     Neck:      Thyroid: No thyromegaly.   Cardiovascular:      Rate and Rhythm: Normal rate and  regular rhythm.      Heart sounds: Normal heart sounds. No murmur heard.  Pulmonary:      Effort: Pulmonary effort is normal. No respiratory distress.      Breath sounds: Normal breath sounds.   Abdominal:      Palpations: Abdomen is soft. There is no mass.      Tenderness: There is no abdominal tenderness.   Neurological:      Mental Status: He is alert.   Psychiatric:         Mood and Affect: Mood normal.         Behavior: Behavior normal.         Thought Content: Thought content normal.         Judgment: Judgment normal.         Assessment & Plan     1. Potential Attention Deficit Disorder (ADD).  A referral will be made for the patient to behavioral health for potential ADHD testing.    2. Sleep apnea.  The patient's sleep study results indicate a diagnosis of sleep apnea. The patient has been advised to consult with his sleep medicine physician regarding the initiation of a CPAP machine.      Diagnoses and all orders for this visit:    1. Primary hypertension (Primary)    2. YADIRA (obstructive sleep apnea)    3. Attention deficit hyperactivity disorder (ADHD), predominantly inattentive type  -     Ambulatory Referral to Behavioral Health       Answers submitted by the patient for this visit:  Primary Reason for Visit (Submitted on 5/28/2024)  What is the primary reason for your visit?: Other  Other (Submitted on 5/28/2024)  Please describe your symptoms.: Im pretty sure i have adhd and i would like to get some understanding and help for it.  Have you had these symptoms before?: Yes  How long have you been having these symptoms?: Greater than 2 weeks    I spent 15 minutes in patient care: Reviewing records prior to the visit, examining the patient, entering orders and documentation    Part of this note may be an electronic transcription/translation of spoken language to printed text using the Dragon Dictation System.      Transcribed from ambient dictation for Kinga Dye PA-C by Gita  Malik.  05/28/24   16:47 EDT    Patient or patient representative verbalized consent to the visit recording.  I have personally performed the services described in this document as transcribed by the above individual, and it is both accurate and complete.

## 2024-06-26 ENCOUNTER — OFFICE VISIT (OUTPATIENT)
Age: 29
End: 2024-06-26
Payer: MEDICAID

## 2024-06-26 VITALS
SYSTOLIC BLOOD PRESSURE: 130 MMHG | DIASTOLIC BLOOD PRESSURE: 70 MMHG | OXYGEN SATURATION: 96 % | HEART RATE: 78 BPM | WEIGHT: 315 LBS | BODY MASS INDEX: 36.45 KG/M2 | HEIGHT: 78 IN

## 2024-06-26 DIAGNOSIS — F41.9 ANXIETY: ICD-10-CM

## 2024-06-26 DIAGNOSIS — R41.840 ATTENTION AND CONCENTRATION DEFICIT: Primary | ICD-10-CM

## 2024-06-26 PROCEDURE — 90792 PSYCH DIAG EVAL W/MED SRVCS: CPT

## 2024-06-26 PROCEDURE — 1160F RVW MEDS BY RX/DR IN RCRD: CPT

## 2024-06-26 PROCEDURE — 1159F MED LIST DOCD IN RCRD: CPT

## 2024-06-26 RX ORDER — BUPROPION HYDROCHLORIDE 150 MG/1
150 TABLET ORAL EVERY MORNING
Qty: 30 TABLET | Refills: 2 | Status: SHIPPED | OUTPATIENT
Start: 2024-06-26 | End: 2025-06-26

## 2024-06-26 NOTE — PROGRESS NOTES
New Patient Office Visit      Date: 2024  Patient Name: Amy Silva  : 1995   MRN: [id number]     Referring Provider: Kinga Dye PA-C    Chief Complaint:      ICD-10-CM ICD-9-CM   1. Attention and concentration deficit  R41.840 799.51   2. Anxiety  F41.9 300.00        History of Present Illness:   Amy Silva is a 28 y.o. male who is here today for evaluation and management of ADHD symptoms and increased symptoms of anxiety. This is the patient's initial encounter with this provider. Patient was referred to  by PCP. Provider informed patient that provider does not perform official ADHD testing.    Patient reports he is pretty sure he has ADHD and has had others tell him he has many ADHD symptoms. Patient reports he didn't really know the symptoms of ADHD until he started researching. Patient reports feeling like something has always been holding him back. 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  Patient provides the example of having a to do list for his business last year and reports not being able to complete any of the tasks on the list.    Patient reports symptoms of ADHD including difficulty focusing, difficulty paying attention, difficulty starting tasks, difficulty completing tasks, difficulty starting tasks due to mental effort, easily overwhelmed, increased anxiety, difficulty listening, difficulty processing, frequently interrupting others, difficulty with patience, rereading the same thing over and over, procrastination, racing thoughts, difficulty sitting still, and difficulty with organization. Patient reports he was admitted to both [facility] and [facility] during his childhood due to behavioral issues.    Patient rates anxiety 7/10 and states anxious symptoms include racing thoughts, increased stress, increased worry, easily irritated, easily frustrated, difficulty falling asleep, difficulty staying asleep  and occasionally feeling on edge. Patient scored 12 on ALIVIA-7, indicating moderate symptoms of anxiety. Patient denies panic attacks. Patient reports occasional mood swings between anxiousness and irritability. Patient reports rare instances of anger outbursts and lashing out. Patient denies manic symptoms such as feeling invincible and on top of the world.     Patient rates depression 5/10 and states depressive symptoms include decreased motivation, decreased pleasure, decreased interest, self-isolation, and fatigue. Patient scored 9 on the PHQ-9, indicating mild symptoms of depression. Patient reports depressive episodes usually last 2-3 days.     Current Medications for MHI:   Zoloft    Current Treatments/Therapy for MHI:   Denies     Subjective      Review of Systems:     Denies seizure, focal weakness, changes in vision, paresthesia’s, or numbness, headache, and neck stiffness  Denies cough, sputum, wheezing, hemoptysis   Denies chest pain, palpitations, orthopnea.   Denies abdominal pain, nausea, vomiting, diarrhea, and constipation   Denies dysuria, urgency, changes in frequency and hematuria   Denies fever and chills   Denies skin rash, hair loss, and edema   Denies ecchymoses and bleeding   Denies heat or cold intolerance, polydipsia, and polyuria  Denies abnormal movements, tics, and tremors  Denies weight gain/loss    Depression Screening:  Patient screened positive for depression based on a PHQ-9 score of 9 on 6/26/2024. Follow-up recommendations include: Prescribed antidepressant medication treatment.      PHQ-9 Depression Screening  Little interest or pleasure in doing things? 1-->several days   Feeling down, depressed, or hopeless? 1-->several days   Trouble falling or staying asleep, or sleeping too much? 2-->more than half the days (Falling asleep.)   Feeling tired or having little energy? 0-->not at all   Poor appetite or overeating? 1-->several days   Feeling bad about yourself - or that you are a  failure or have let yourself or your family down? 1-->several days   Trouble concentrating on things, such as reading the newspaper or watching television? 3-->nearly every day   Moving or speaking so slowly that other people could have noticed? Or the opposite - being so fidgety or restless that you have been moving around a lot more than usual? 0-->not at all   Thoughts that you would be better off dead, or of hurting yourself in some way? 0-->not at all   PHQ-9 Total Score 9   If you checked off any problems, how difficult have these problems made it for you to do your work, take care of things at home, or get along with other people? very difficult        ALIVIA-7      Over the last two weeks, how often have you been bothered by the following problems?  Feeling nervous, anxious or on edge: More than half the days  Not being able to stop or control worrying: More than half the days  Worrying too much about different things: More than half the days  Trouble Relaxing: More than half the days  Being so restless that it is hard to sit still: More than half the days  Becoming easily annoyed or irritable: More than half the days  Feeling afraid as if something awful might happen: Not at all  ALIVIA 7 Total Score: 12  If you checked any problems, how difficult have these problems made it for you to do your work, take care of things at home, or get along with other people: Very difficult    SUICIDE RISK ASSESSMENT/CSSRS:  1. Does client have thoughts /of suicide? no  2. Does client have intent for suicide? no  3. Does client have a current plan for suicide? no  4. History of suicide attempts: no  5. Family history of suicide or attempts: no  6. History of violent behaviors towards others or property or thoughts of committing suicide: no  7. History of sexual aggression toward others: no  8. Access to firearms or weapons: no    Past Psychiatric History:   History of outpatient psychiatrist: history   Diagnoses: Denies   History  of outpatient therapy: Denies   Previous Inpatient hospitalizations: Yes, [facility] during childhood  Previous medication trials: Zoloft  History of suicide/self harm attempts: Denies     Review of Psychiatric Systems:  Mood (depression, efren/hypomania): Depressive symptoms, denies efren  Psychosis/thought disturbances: Denies   Anxiety/panic: increased stress and anxiety, denies panic attacks  Obsessions and compulsions: Denies   Abuse (verbal/physical/sexual) /Trauma: Denies   Dissociation: Denies   Somatic concerns: Denies   Appetite: normal   Sleep pattern: 5 hours of inconsistent sleep per night. Reports difficulty falling and staying asleep  Personality disorders: Denies     Abuse/trauma History:              Physical: Denies               Sexual: Denies               Emotional/Neglect: Denies               Significant death/loss: Denies               Other trauma: multiple traumatic experiences in past              Head Injury/Seizures: Denies   Triggers: (Persons/Places/Things/Events/Thought/Emotions): location, people     Substance Abuse History/Last use:              Alcohol: Occasional              Tobacco/Vape: Vaping daily               Illicit Drugs: Denies               Caffeine: Daily coffee              Seizures: Denies     Legal History:   DUI/DWI     Educational and Occupational History:               Highest level of education obtained: GED               History? no              Patient's Occupation: self employed full time    Interpersonal/Relational:              Marital Status: single              Support system: good support system with family and friends     Social History:  Where was patient born: [place], [place]  Upbringing: Mother  Where does patient currently live: [place], [place]  Living situation: lives with girlfriend  Leisure and Recreation: working out, going to movies  Anglican: Denies   Developmental history: All milestones met yes    Family History:   No family  "history on file.    Family Psychiatric History:   Psych Diagnosis:    Brother-Bipolar  History of suicide/self harm attempts: Denies   History of Substance abuse:    Father-substance abuse    Past Medical History:   Past Medical History:   Diagnosis Date    Hypertension     Sleep apnea        Past Surgical History:   Past Surgical History:   Procedure Laterality Date    CHEST TUBE INSERTION         Medications:     Current Outpatient Medications:     doxazosin (Cardura) 1 MG tablet, Take 1 tablet by mouth Every Night. For BP, Disp: 30 tablet, Rfl: 5    NIFEdipine XL (PROCARDIA XL) 90 MG 24 hr tablet, Take 1 tablet by mouth Daily. for blood pressure, Disp: 30 tablet, Rfl: 5    sertraline (Zoloft) 50 MG tablet, Take 1 tablet by mouth Daily. For depression and anxiety, Disp: 30 tablet, Rfl: 11    valACYclovir (Valtrex) 500 MG tablet, Take 1 tablet by mouth 2 (Two) Times a Day. As needed for outbreaks, Disp: 10 tablet, Rfl: 11    buPROPion XL (Wellbutrin XL) 150 MG 24 hr tablet, Take 1 tablet by mouth Every Morning., Disp: 30 tablet, Rfl: 2    Medication Considerations:  DAVE reviewed and appropriate.      Herbals and supplements: Multi Vit    Allergies:   No Known Allergies    Objective     Physical Exam:  Vital Signs:   Vitals:    06/26/24 1348 06/26/24 1351   BP:  130/70   Pulse: 78    SpO2: 96%    Weight: (!) 166 kg (365 lb)    Height: 198.1 cm (77.99\")      Body mass index is 42.19 kg/m².     Mental Status Exam:   MENTAL STATUS EXAM   General Appearance:  Cleanly groomed and dressed and well developed  Eye Contact:  Good eye contact  Attitude:  Cooperative  Motor Activity:  Fidgety and restless  Muscle Strength:  Normal  Speech:  Normal rate, tone, volume  Language:  Spontaneous  Mood and affect:  Normal, pleasant and anxious  Hopelessness:  Denies  Loneliness: Denies  Thought Process:  Logical and other  Other Comment:  Mind blanking  Associations/ Thought Content:  No delusions  Hallucinations:  None  Suicidal " "Ideations:  Not present  Homicidal Ideation:  Not present  Sensorium:  Alert and clear  Orientation:  Person, place, time and situation  Immediate Recall, Recent, and Remote Memory:  Intact  Attention Span/ Concentration:  Good and easily distracted  Fund of Knowledge:  Appropriate for age and educational level  Intellectual Functioning:  Average range  Insight:  Good  Judgement:  Good  Reliability:  Good  Impulse Control:  Fair       @RESULASTCBCDIFFPANEL,TSH,LABLIPI,RUIDYWKD43,HAGEYRVM86,MG,FOLATE,PROLACTIN,CRPRESULT,CMP,Z6SIYETYGON)@    Lab Results   Component Value Date    GLUCOSE 100 (H) 04/21/2019    BUN 9 04/21/2019    CREATININE 1.16 04/21/2019    BCR 7.8 04/21/2019    K 4.0 04/21/2019    CO2 25.0 04/21/2019    CALCIUM 9.4 04/21/2019    ALBUMIN 4.00 04/21/2019    BILITOT 0.4 04/21/2019    AST 24 04/21/2019    ALT 27 04/21/2019       Lab Results   Component Value Date    WBC 6.04 04/21/2019    HGB 15.4 04/21/2019    HCT 46.3 04/21/2019    MCV 86.1 04/21/2019     04/21/2019       No results found for: \"CHOL\", \"CHLPL\", \"TRIG\", \"HDL\", \"LDL\"    Assessment / Plan      Visit Diagnosis/Orders Placed This Visit:  Diagnoses and all orders for this visit:    1. Attention and concentration deficit (Primary)  -     buPROPion XL (Wellbutrin XL) 150 MG 24 hr tablet; Take 1 tablet by mouth Every Morning.  Dispense: 30 tablet; Refill: 2    2. Anxiety  -     buPROPion XL (Wellbutrin XL) 150 MG 24 hr tablet; Take 1 tablet by mouth Every Morning.  Dispense: 30 tablet; Refill: 2        Prognosis: Good with Ongoing Treatment  Patient's diagnoses include attention and concentration deficit and anxiety. Unique factors influencing symptom alleviation/remission include: pre-existing conditions, symptom chronicity, symptom severity, degree of impairment, social support, financial security, motivation, patient engagement and medication adherence. Prognosis is largely dependent on patient's adherence to medication treatment plan, " follow up appointments and willingness to engage in psychotherapy      Functional Status: Mild impairment     Impression/Formulation: Patient appeared alert and oriented. Patient's major concerns for today's visit include evaluation and management of ADHD symptoms and increased anxiety.      Treatment and medication options discussed during today's visit.  Opportunity provided for any necessary clarification and patient questions. Patient acknowledges and verbally consents to proceed with mutually agreed upon treatment plan. Patient is voluntarily requesting to begin outpatient psychiatric treatment at [facility] [address] Patient is receptive to assistance with maintaining a stable lifestyle. Patient presents with history of     ICD-10-CM ICD-9-CM   1. Attention and concentration deficit  R41.840 799.51   2. Anxiety  F41.9 300.00   .    Reviewed patient's previous provider notes. Reviewed most recent labs. Patient meets DSM V diagnostic criteria for diagnoses. Diagnoses may be updated as more information becomes available.       Differential diagnoses include: ADHD, ALIVIA, MDD    Treatment Plan:     Continue with current medication regimen. Discussed tapering Zoloft in the future per patient request.    Initiate Wellbutrin 150mg XL PO qam.   Provider informed patient that provider will be on leave starting July 18. Provider informed patient that someone will be covering for provider during this time and they can call the office as needed.    Follow-up in 2 months and as needed.    Patient will pursue supportive psychotherapy efforts and medications as prescribed. Provider instructed patient to obtain psychiatric medication from this provider only to prevent polypharmacy and possible overprescribing or unsafe medication combinations. Clinic will obtain release of information for current treatment team for continuity of care as needed. Patient will contact this office, call 911 or  present to the nearest emergency room should suicidal or homicidal ideations occur. Discussed medication options and treatment plan of prescribed medication(s) as well as the risks, benefits, and potential side effects. Patient acknowledged and verbally consented to continue with current treatment plan and was educated on the importance of compliance with treatment and follow-up appointments.      MEDICATION Treatment: Discussed medication treatment options and plan of prescribed medication. Potential risks, benefits, and side effects including but not limited to the following reviewed: Black Box warnings, worsening symptoms, SI, sedation, GI side effects, metabolic alterations and blood pressure fluctuations. Patient is reminded to refrain from illicit substance use, including alcohol and THC while taking medications. Also advised to refrain from activity requiring alertness until sedative effects of medication are assessed.      Short-term goals: Patient will adhere to medication regimen and experience continued improvement in symptoms over the next 3 months.   Long-term goals: Patient will adhere to medication treatment plan and report improvement in symptoms over the next 6 months    Quality Measures:     TOBACCO USE:  Tobacco Use: Medium Risk (5/28/2024)    Patient History     Smoking Tobacco Use: Former     Smokeless Tobacco Use: Never     Passive Exposure: Not on file      Former smoker    I advised Amy of the risks of tobacco use.     Follow Up:   Return in about 2 months (around 8/26/2024).    NABIL Arias, PMHNP-[facility] [address] [address]

## 2024-06-27 ENCOUNTER — TELEPHONE (OUTPATIENT)
Dept: SLEEP MEDICINE | Age: 29
End: 2024-06-27
Payer: MEDICAID

## 2024-06-27 DIAGNOSIS — G47.33 OSA (OBSTRUCTIVE SLEEP APNEA): Primary | ICD-10-CM

## 2024-06-27 NOTE — TELEPHONE ENCOUNTER
Patient hasn't heard from anyone for Sleep Study done 4-9-24 on the result or what is the next step. Please call patient at [phone].

## 2024-07-15 NOTE — PROGRESS NOTES
Sleep Clinic Follow Up Note    Chief Complaint  Sleep Apnea (Follow up )    Subjective     History of Present Illness (from previous encounter on 1/23/2023):  Amy Silva is a 27 y.o. male with a history of hypertension.  He is referred by Dr. Mittal with internal medicine.  Patient has been told for some time that he has sleep apnea with witnessed episodes of apnea.  Per his questionnaire the patient reports snoring, witnessed apnea, and frequent awakening.  Typically the patient goes to bed at 12-1 AM waking at 6-7 AM on weekdays and weekends.  He estimates an average of 7 hours of sleep per night.  It takes him approximately 30 minutes to get to sleep.  Patient uses e-cigarettes, drinks alcohol 2-4 times per month, and used marijuana several years ago.  He drinks 1 cup of regular coffee daily. He ask about astral projection. He reports that he fell asleep, and when he was waking up he was trying to push himself up when he felt as though he saw himself in bed (such as a type of out of body experience). (End copied text).    -In lab polysomnography was obtained on 4/10/2024 revealing moderate obstructive sleep apnea with an AHI of 23.3/H, which increased in the supine position to an AHI of 52/H    Interval History:  Amy Silva is a 28 y.o. male returns for follow up. He has not yet started with treatment. On average the patient sleeps 6 hours per night. The patient wakes 1 times per night. He comes for further discussion.    The patient reports the following changes to their medical and medication history since they were last seen:  Wellbutrin started for anxiety    Further details are as follows:      [employer] Scale is (out of 24): Total score: 7     Weight:  Current Weight: 357 lb    Weight change in the last year:  loss: 25 lbs    The patient's relevant past medical, surgical, family, and social history reviewed and updated in Epic as appropriate.    PMH:    Past Medical History:   Diagnosis Date     "Hypertension     Sleep apnea      Past Surgical History:   Procedure Laterality Date    CHEST TUBE INSERTION         No Known Allergies    MEDS:  Prior to Admission medications    Medication Sig Start Date End Date Taking? Authorizing Provider   buPROPion XL (Wellbutrin XL) 150 MG 24 hr tablet Take 1 tablet by mouth Every Morning. 6/26/24 6/26/25  Niyah Nicholas APRN   doxazosin (Cardura) 1 MG tablet Take 1 tablet by mouth Every Night. For BP 1/16/24   Kinga Dye PA-C   NIFEdipine XL (PROCARDIA XL) 90 MG 24 hr tablet Take 1 tablet by mouth Daily. for blood pressure 1/16/24   Kinga Dye PA-C   sertraline (Zoloft) 50 MG tablet Take 1 tablet by mouth Daily. For depression and anxiety 1/3/24   Kinga Dye PA-C   valACYclovir (Valtrex) 500 MG tablet Take 1 tablet by mouth 2 (Two) Times a Day. As needed for outbreaks 1/3/24   Kinga Dye PA-C         FH:  History reviewed. No pertinent family history.    Objective   Vital Signs:  /88 (BP Location: Left arm, Patient Position: Sitting, Cuff Size: Adult)   Pulse 70   Temp 97.1 °F (36.2 °C) (Infrared)   Ht 198.1 cm (77.99\")   Wt (!) 162 kg (357 lb 14.4 oz)   SpO2 95%   BMI 41.37 kg/m²     Patient's (Body mass index is 41.37 kg/m².) indicates that they are morbidly obese (BMI > 40 or > 35 with obesity - related health condition) with health related conditions that include obstructive sleep apnea, hypertension, coronary heart disease, and diabetes mellitus . Weight is improving with treatment. BMI is is above average; BMI management plan is completed. We discussed portion control and increasing exercise.            Physical Exam  Vitals reviewed.   Constitutional:       Appearance: Normal appearance.   HENT:      Head: Normocephalic and atraumatic.      Nose: Nose normal.      Mouth/Throat:      Mouth: Mucous membranes are moist.   Cardiovascular:      Rate and Rhythm: Normal rate and regular rhythm.      Heart sounds: No murmur " heard.     No friction rub. No gallop.   Pulmonary:      Effort: Pulmonary effort is normal. No respiratory distress.      Breath sounds: Normal breath sounds. No wheezing or rhonchi.   Neurological:      Mental Status: He is alert and oriented to person, place, and time.   Psychiatric:         Behavior: Behavior normal.               Result Review :                Assessment and Plan  Amy Silva is a 28 y.o. male who returns for follow-up after sleep study.  The patient was found with moderate to severe obstructive sleep apnea.  Overall AHI was 23.3/H, which increased in the supine position to an AHI of 52/H.  I discussed treatment options including PAP therapy, mandibular advancement device, and surgical consult.  Patient is willing to try PAP therapy.    I will place orders for new device and supplies and have asked the patient to return for follow-up and compliance in 31-90 days.  I have noted the patient will need to use the device more than 4 hours a night, more than 70% of the time in order to remain fully compliant.  Patient has been working on his weight with diet and exercise.  He has lost approximately 25 pounds.    Diagnoses and all orders for this visit:    1. YADIRA (obstructive sleep apnea) (Primary)  -     PAP Therapy    2. Morbid (severe) obesity due to excess calories           The patient continues to use and benefit from PAP therapy.    1. The patient was counseled regarding multimodal approach with healthy nutrition, healthy sleep, regular physical activity, social activities, counseling, and medications. Encouraged to practice lateral sleep position. Avoid alcohol and sedatives close to bedtime.            Follow Up  Return for 31 to 90 days after PAP setup.  Patient was given instructions and counseling regarding his condition or for health maintenance advice. Please see specific information pulled into the AVS if appropriate.       NABIL Scott, ACNP-BC  Pulmonology, Critical Care,  and Sleep Medicine

## 2024-07-18 ENCOUNTER — OFFICE VISIT (OUTPATIENT)
Dept: SLEEP MEDICINE | Age: 29
End: 2024-07-18
Payer: MEDICAID

## 2024-07-18 VITALS
BODY MASS INDEX: 36.45 KG/M2 | HEART RATE: 70 BPM | OXYGEN SATURATION: 95 % | HEIGHT: 78 IN | TEMPERATURE: 97.1 F | WEIGHT: 315 LBS | DIASTOLIC BLOOD PRESSURE: 88 MMHG | SYSTOLIC BLOOD PRESSURE: 140 MMHG

## 2024-07-18 DIAGNOSIS — E66.01 MORBID (SEVERE) OBESITY DUE TO EXCESS CALORIES: ICD-10-CM

## 2024-07-18 DIAGNOSIS — G47.33 OSA (OBSTRUCTIVE SLEEP APNEA): Primary | ICD-10-CM

## 2024-07-23 ENCOUNTER — TELEMEDICINE (OUTPATIENT)
Dept: FAMILY MEDICINE CLINIC | Facility: TELEHEALTH | Age: 29
End: 2024-07-23
Payer: MEDICAID

## 2024-07-23 DIAGNOSIS — J06.9 UPPER RESPIRATORY TRACT INFECTION, UNSPECIFIED TYPE: Primary | ICD-10-CM

## 2024-07-23 RX ORDER — BROMPHENIRAMINE MALEATE, PSEUDOEPHEDRINE HYDROCHLORIDE, AND DEXTROMETHORPHAN HYDROBROMIDE 2; 30; 10 MG/5ML; MG/5ML; MG/5ML
10 SYRUP ORAL 4 TIMES DAILY PRN
Qty: 200 ML | Refills: 0 | Status: SHIPPED | OUTPATIENT
Start: 2024-07-23

## 2024-07-23 RX ORDER — COVID-19 ANTIGEN TEST
1 KIT MISCELLANEOUS ONCE
Qty: 1 KIT | Refills: 0 | Status: SHIPPED | OUTPATIENT
Start: 2024-07-23 | End: 2024-07-23

## 2024-07-23 NOTE — PATIENT INSTRUCTIONS
Upper Respiratory Infection, Adult  An upper respiratory infection (URI) is a common viral infection of the nose, throat, and upper air passages that lead to the lungs. The most common type of URI is the common cold. URIs usually get better on their own, without medical treatment.  What are the causes?  A URI is caused by a virus. You may catch a virus by:  Breathing in droplets from an infected person's cough or sneeze.  Touching something that has been exposed to the virus (is contaminated) and then touching your mouth, nose, or eyes.  What increases the risk?  You are more likely to get a URI if:  You are very young or very old.  You have close contact with others, such as at work, school, or a health care facility.  You smoke.  You have long-term (chronic) heart or lung disease.  You have a weakened disease-fighting system (immune system).  You have nasal allergies or asthma.  You are experiencing a lot of stress.  You have poor nutrition.  What are the signs or symptoms?  A URI usually involves some of the following symptoms:  Runny or stuffy (congested) nose.  Cough.  Sneezing.  Sore throat.  Headache.  Fatigue.  Fever.  Loss of appetite.  Pain in your forehead, behind your eyes, and over your cheekbones (sinus pain).  Muscle aches.  Redness or irritation of the eyes.  Pressure in the ears or face.  How is this diagnosed?  This condition may be diagnosed based on your medical history and symptoms, and a physical exam. Your health care provider may use a swab to take a mucus sample from your nose (nasal swab). This sample can be tested to determine what virus is causing the illness.  How is this treated?  URIs usually get better on their own within 7-10 days. Medicines cannot cure URIs, but your health care provider may recommend certain medicines to help relieve symptoms, such as:  Over-the-counter cold medicines.  Cough suppressants. Coughing is a type of defense against infection that helps to clear the  respiratory system, so take these medicines only as recommended by your health care provider.  Fever-reducing medicines.  Follow these instructions at home:  Activity  Rest as needed.  If you have a fever, stay home from work or school until your fever is gone or until your health care provider says your URI cannot spread to other people (is no longer contagious). Your health care provider may have you wear a face mask to prevent your infection from spreading.  Relieving symptoms  Gargle with a mixture of salt and water 3-4 times a day or as needed. To make salt water, completely dissolve ½-1 tsp (3-6 g) of salt in 1 cup (237 mL) of warm water.  Use a cool-mist humidifier to add moisture to the air. This can help you breathe more easily.  Eating and drinking    Drink enough fluid to keep your urine pale yellow.  Eat soups and other clear broths.  General instructions    Take over-the-counter and prescription medicines only as told by your health care provider. These include cold medicines, fever reducers, and cough suppressants.  Do not use any products that contain nicotine or tobacco. These products include cigarettes, chewing tobacco, and vaping devices, such as e-cigarettes. If you need help quitting, ask your health care provider.  Stay away from secondhand smoke.  Stay up to date on all immunizations, including the yearly (annual) flu vaccine.  Keep all follow-up visits. This is important.  How to prevent the spread of infection to others  URIs can be contagious. To prevent the infection from spreading:  Wash your hands with soap and water for at least 20 seconds. If soap and water are not available, use hand .  Avoid touching your mouth, face, eyes, or nose.  Cough or sneeze into a tissue or your sleeve or elbow instead of into your hand or into the air.    Contact a health care provider if:  You are getting worse instead of better.  You have a fever or chills.  Your mucus is brown or red.  You have  yellow or brown discharge coming from your nose.  You have pain in your face, especially when you bend forward.  You have swollen neck glands.  You have pain while swallowing.  You have white areas in the back of your throat.  Get help right away if:  You have shortness of breath that gets worse.  You have severe or persistent:  Headache.  Ear pain.  Sinus pain.  Chest pain.  You have chronic lung disease along with any of the following:  Making high-pitched whistling sounds when you breathe, most often when you breathe out (wheezing).  Prolonged cough (more than 14 days).  Coughing up blood.  A change in your usual mucus.  You have a stiff neck.  You have changes in your:  Vision.  Hearing.  Thinking.  Mood.  These symptoms may be an emergency. Get help right away. Call 911.  Do not wait to see if the symptoms will go away.  Do not drive yourself to the hospital.  Summary  An upper respiratory infection (URI) is a common infection of the nose, throat, and upper air passages that lead to the lungs.  A URI is caused by a virus.  URIs usually get better on their own within 7-10 days.  Medicines cannot cure URIs, but your health care provider may recommend certain medicines to help relieve symptoms.  This information is not intended to replace advice given to you by your health care provider. Make sure you discuss any questions you have with your health care provider.  Document Revised: 07/20/2022 Document Reviewed: 07/20/2022  [employer] Patient Education © 2024 [employer]

## 2024-07-23 NOTE — PROGRESS NOTES
You have chosen to receive care through a telehealth visit.  Do you consent to use a video/audio connection for your medical care today? Yes     CHIEF COMPLAINT  No chief complaint on file.        HPI  Amy Silva is a 28 y.o. male  presents with complaint of 2 day history of body aches, chills, cough with yellow sputum production, fatigue.  Denies fever, headache, sore throat, wheezing, shortness of breath.  Has not yet tested for COVID-19.      Review of Systems  See HPI    Past Medical History:   Diagnosis Date    Hypertension     Sleep apnea        No family history on file.    Social History     Socioeconomic History    Marital status: Single   Tobacco Use    Smoking status: Former     Current packs/day: 0.00     Average packs/day: 0.3 packs/day for 4.0 years (1.0 ttl pk-yrs)     Types: Cigarettes     Quit date: 2022     Years since quittin.7    Smokeless tobacco: Never   Vaping Use    Vaping status: Every Day    Substances: Nicotine    Devices: Disposable   Substance and Sexual Activity    Alcohol use: Yes     Comment: A few drinks maybe twice a month    Drug use: No    Sexual activity: Not Currently     Partners: Female     Birth control/protection: Condom       Amy Silva  reports that he quit smoking about 20 months ago. His smoking use included cigarettes. He has a 1 pack-year smoking history. He has never used smokeless tobacco.               There were no vitals taken for this visit.    PHYSICAL EXAM  Physical Exam   Constitutional: He is oriented to person, place, and time. He appears well-developed and well-nourished. He does not have a sickly appearance. He does not appear ill.   HENT:   Head: Normocephalic and atraumatic.   Pulmonary/Chest: Effort normal.  No respiratory distress.  Neurological: He is alert and oriented to person, place, and time.         Diagnoses and all orders for this visit:    1. Upper respiratory tract infection, unspecified type (Primary)  -      brompheniramine-pseudoephedrine-DM 30-2-10 MG/5ML syrup; Take 10 mL by mouth 4 (Four) Times a Day As Needed for Congestion, Cough or Allergies.  Dispense: 200 mL; Refill: 0  -     COVID-19 At Home Antigen Test (BinaxNOW COVID-19 Ag Home Test) kit; 1 each by In Vitro route 1 (One) Time for 1 dose.  Dispense: 1 kit; Refill: 0    --take medications as prescribed  --increase fluids, rest as needed, tylenol or ibuprofen for pain  --f/u in 5-7 days if no improvement        FOLLOW-UP  As discussed during visit with PCP/[facility] if no improvement or Urgent Care/Emergency Department if worsening of symptoms    Patient verbalizes understanding of medication dosage, comfort measures, instructions for treatment and follow-up.    Leigh Harrington, NABIL  07/23/2024  09:33 EDT    The use of a video visit has been reviewed with the patient and verbal informed consent has been obtained. Myself and Amy Silva participated in this visit. The patient is located in [address] [place] [address].    I am located in [place], [place]. [employer] and [employer] were utilized. I spent 8 minutes in the patient's chart for this visit.      Note Disclaimer: At [facility], we believe that sharing information builds trust and better   relationships. You are receiving this note because you recently visited [facility]. It is possible you   will see health information before a provider has talked with you about it. This kind of information can   be easy to misunderstand. To help you fully understand what it means for your health, we urge you to   discuss this note with your provider.

## 2024-08-08 RX ORDER — DOXAZOSIN MESYLATE 1 MG/1
TABLET ORAL
Qty: 90 TABLET | Refills: 1 | Status: SHIPPED | OUTPATIENT
Start: 2024-08-08

## 2024-08-14 ENCOUNTER — TELEPHONE (OUTPATIENT)
Dept: PULMONOLOGY | Facility: CLINIC | Age: 29
End: 2024-08-14

## 2024-08-14 DIAGNOSIS — G47.33 OBSTRUCTIVE SLEEP APNEA, ADULT: Primary | ICD-10-CM

## 2024-08-14 NOTE — TELEPHONE ENCOUNTER
Caller: NICANOR MACIEL        Best call back number:     Patient is needing: [facility] RECEIVED ORDERS FOR SUPPLIES BUT NOT ORDER FOR MACHINE. CAN THIS BE SENT TO THEM FAX:[phone]

## 2024-08-22 RX ORDER — VALACYCLOVIR HYDROCHLORIDE 500 MG/1
500 TABLET, FILM COATED ORAL 2 TIMES DAILY
Qty: 10 TABLET | Refills: 11 | Status: SHIPPED | OUTPATIENT
Start: 2024-08-22

## 2024-09-10 ENCOUNTER — OFFICE VISIT (OUTPATIENT)
Age: 29
End: 2024-09-10
Payer: MEDICAID

## 2024-09-10 VITALS
SYSTOLIC BLOOD PRESSURE: 150 MMHG | BODY MASS INDEX: 41.24 KG/M2 | OXYGEN SATURATION: 94 % | HEART RATE: 88 BPM | WEIGHT: 315 LBS | DIASTOLIC BLOOD PRESSURE: 88 MMHG | TEMPERATURE: 97.5 F

## 2024-09-10 DIAGNOSIS — F41.9 ANXIETY: ICD-10-CM

## 2024-09-10 DIAGNOSIS — R41.840 ATTENTION AND CONCENTRATION DEFICIT: Primary | ICD-10-CM

## 2024-09-10 PROCEDURE — 1159F MED LIST DOCD IN RCRD: CPT

## 2024-09-10 PROCEDURE — 1160F RVW MEDS BY RX/DR IN RCRD: CPT

## 2024-09-10 PROCEDURE — 99214 OFFICE O/P EST MOD 30 MIN: CPT

## 2024-09-10 RX ORDER — BUPROPION HYDROCHLORIDE 300 MG/1
300 TABLET ORAL EVERY MORNING
Qty: 30 TABLET | Refills: 2 | Status: SHIPPED | OUTPATIENT
Start: 2024-09-10 | End: 2025-09-10

## 2024-09-10 NOTE — PROGRESS NOTES
"     Follow Up Office Visit      Patient Name: Amy Silva  : 1995   MRN: [id number]     Referring Provider: Kinga Dye PA-C    Chief Complaint:      ICD-10-CM ICD-9-CM   1. Attention and concentration deficit  R41.840 799.51   2. Anxiety  F41.9 300.00        History of Present Illness:   Amy Silva is a 29 y.o. male who is here today for follow up and medication management.    Answers submitted by the patient for this visit:  Other (Submitted on 9/10/2024)  Please describe your symptoms.: Check up on medication  Have you had these symptoms before?: Yes  How long have you been having these symptoms?: Greater than 2 weeks  Primary Reason for Visit (Submitted on 9/10/2024)  What is the primary reason for your visit?: Problem Not Listed  Subjective      Patient Reports:   Patient reports things are about the same since starting medication. Patient denies negative side effects when starting medication. Patient reports work has been going good. 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 Patient reports \"writer's block\" with creativity related to his business.     Patient rates anxiety 6/10. Patient denies panic attacks. Patient reports increased irritability related to relationship and business. Patient denies anger outburst and lashing out. Patient rates depression /10. Patient denies SI/HI/AVH.     PHQ-9= 8 decreased score from previous visit  ALIVIA-7=5 decreased score from previous visit    Review of Systems:   Review of Systems   Constitutional:  Negative for appetite change, fatigue and unexpected weight change.   Eyes:  Negative for visual disturbance.   Respiratory:  Negative for chest tightness and shortness of breath.    Cardiovascular:  Negative for chest pain.   Musculoskeletal:  Negative for gait problem.   Skin:  Negative for rash and wound.   Neurological:  Negative for dizziness, tremors, seizures, weakness, light-headedness and headaches. "   Psychiatric/Behavioral:  Negative for agitation, behavioral problems, confusion, decreased concentration, dysphoric mood, hallucinations, self-injury, sleep disturbance and suicidal ideas. The patient is not nervous/anxious and is not hyperactive.      Sleep pattern: recently CPAP. 5-6 hours of inconsistent sleep  Appetite: normal     PHQ-9 Depression Screening  Little interest or pleasure in doing things? 1-->several days   Feeling down, depressed, or hopeless? 1-->several days   Trouble falling or staying asleep, or sleeping too much? 1-->several days   Feeling tired or having little energy? 1-->several days   Poor appetite or overeating? 1-->several days   Feeling bad about yourself - or that you are a failure or have let yourself or your family down? 1-->several days   Trouble concentrating on things, such as reading the newspaper or watching television? 1-->several days   Moving or speaking so slowly that other people could have noticed? Or the opposite - being so fidgety or restless that you have been moving around a lot more than usual? 1-->several days   Thoughts that you would be better off dead, or of hurting yourself in some way? 0-->not at all   PHQ-9 Total Score 8   If you checked off any problems, how difficult have these problems made it for you to do your work, take care of things at home, or get along with other people? somewhat difficult     ALIVIA-7 Anxiety Screening  Over the last two weeks, how often have you been bothered by the following problems?  Feeling nervous, anxious or on edge: Several days  Not being able to stop or control worrying: Not at all  Worrying too much about different things: Several days  Trouble Relaxing: Several days  Being so restless that it is hard to sit still: Several days  Becoming easily annoyed or irritable: Several days  Feeling afraid as if something awful might happen: Not at all  ALIVIA 7 Total Score: 5  If you checked any problems, how difficult have these  "problems made it for you to do your work, take care of things at home, or get along with other people: Somewhat difficult    RISK ASSESSMENT:  Patient denies any thoughts or intent of suicide today. Patient denies any impulsive behavior today.     Medications:     Current Outpatient Medications:     doxazosin (CARDURA) 1 MG tablet, TAKE 1 TABLET BY MOUTH EVERY NIGHT FOR BLOOD PRESSURE, Disp: 90 tablet, Rfl: 1    NIFEdipine XL (PROCARDIA XL) 90 MG 24 hr tablet, Take 1 tablet by mouth Daily. for blood pressure, Disp: 30 tablet, Rfl: 5    sertraline (Zoloft) 50 MG tablet, Take 1 tablet by mouth Daily. For depression and anxiety, Disp: 30 tablet, Rfl: 2    valACYclovir (Valtrex) 500 MG tablet, Take 1 tablet by mouth 2 (Two) Times a Day. As needed for outbreaks, Disp: 10 tablet, Rfl: 11    buPROPion XL (Wellbutrin XL) 300 MG 24 hr tablet, Take 1 tablet by mouth Every Morning., Disp: 30 tablet, Rfl: 2    Medication Considerations:  DAVE reviewed and appropriate.      Allergies:   No Known Allergies    Results Reviewed:       Objective     Physical Exam:  Vital Signs:   Vitals:    09/10/24 1008   BP: 150/88   Pulse: 88   Temp: 97.5 °F (36.4 °C)   TempSrc: Infrared   SpO2: 94%   Weight: (!) 162 kg (356 lb 12.8 oz)     Body mass index is 41.24 kg/m².     Mental Status Exam:   MENTAL STATUS EXAM   General Appearance:  Cleanly groomed and dressed and well developed  Eye Contact:  Good eye contact  Attitude:  Cooperative  Motor Activity:  Normal gait, posture and fidgety  Muscle Strength:  Normal  Speech:  Normal rate, tone, volume  Language:  Spontaneous  Mood and affect:  Normal, pleasant  Hopelessness:  Denies  Loneliness: Denies  Thought Process:  Other  Other Comment:  \"mind blanking\"  Associations/ Thought Content:  No delusions  Hallucinations:  None  Suicidal Ideations:  Not present  Homicidal Ideation:  Not present  Sensorium:  Alert and clear  Orientation:  Person, place, time and situation  Immediate Recall, " "Recent, and Remote Memory:  Intact  Attention Span/ Concentration:  Good  Fund of Knowledge:  Appropriate for age and educational level  Intellectual Functioning:  Average range  Insight:  Good  Judgement:  Good  Reliability:  Good  Impulse Control:  Fair       @RESULASTCBCDIFFPANEL,TSH,LABLIPI,[id number],ULCBXZVA61,MG,FOLATE,PROLACTIN,CRPRESULT,CMP,[id number])@    Lab Results   Component Value Date    GLUCOSE 100 (H) 04/21/2019    BUN 9 04/21/2019    CREATININE 1.16 04/21/2019    BCR 7.8 04/21/2019    K 4.0 04/21/2019    CO2 25.0 04/21/2019    CALCIUM 9.4 04/21/2019    ALBUMIN 4.00 04/21/2019    BILITOT 0.4 04/21/2019    AST 24 04/21/2019    ALT 27 04/21/2019       Lab Results   Component Value Date    WBC 6.04 04/21/2019    HGB 15.4 04/21/2019    HCT 46.3 04/21/2019    MCV 86.1 04/21/2019     04/21/2019       No results found for: \"CHOL\", \"CHLPL\", \"TRIG\", \"HDL\", \"LDL\"    Assessment / Plan      Visit Diagnosis/Orders Placed This Visit:  Diagnoses and all orders for this visit:    1. Attention and concentration deficit (Primary)  -     buPROPion XL (Wellbutrin XL) 300 MG 24 hr tablet; Take 1 tablet by mouth Every Morning.  Dispense: 30 tablet; Refill: 2    2. Anxiety  -     sertraline (Zoloft) 50 MG tablet; Take 1 tablet by mouth Daily. For depression and anxiety  Dispense: 30 tablet; Refill: 2         Functional Status: Mild impairment     Prognosis: Good with Ongoing Treatment     Impression/Formulation:  Patient appeared alert and oriented.  Patient is voluntarily requesting to continue outpatient psychiatric treatment at [facility] [address] [facility].  Patient is receptive to assistance with maintaining a stable lifestyle.  Patient presents with history of     ICD-10-CM ICD-9-CM   1. Attention and concentration deficit  R41.840 799.51   2. Anxiety  F41.9 300.00   .    Reviewed patient's previous provider notes. Reviewed most recent labs. Patient meets DSM V diagnostic criteria " for diagnoses. Diagnoses may be updated as more information becomes available.       Treatment Plan:   Continue with Lexapro 50mg PO qd. Refilled medication.  Increase Wellbutrin 300mg PO qd. Prescription ordered  Follow up in 1 month and as needed     Patient will continue supportive psychotherapy efforts and medications as indicated. Clinic will obtain release of information for current treatment team for continuity of care as needed. Patient will contact this office, call 911 or present to the nearest emergency room should suicidal or homicidal ideations occur.  Discussed medication options and treatment plan of prescribed medication(s) as well as the risks, benefits, and potential side effects. Patient acknowledged and verbally consented to continue with current treatment plan and was educated on the importance of compliance with treatment and follow-up appointments.     Quality Measures:   Never smoker    I advised Amy Silva of the risks of tobacco use.     Follow Up:   Return in about 1 month (around 10/10/2024).      NABIL Arias  [facility] [address] [address]

## 2024-09-16 ENCOUNTER — OFFICE VISIT (OUTPATIENT)
Dept: SLEEP MEDICINE | Age: 29
End: 2024-09-16
Payer: MEDICAID

## 2024-09-16 VITALS
OXYGEN SATURATION: 97 % | SYSTOLIC BLOOD PRESSURE: 140 MMHG | DIASTOLIC BLOOD PRESSURE: 70 MMHG | BODY MASS INDEX: 36.45 KG/M2 | HEIGHT: 78 IN | WEIGHT: 315 LBS | TEMPERATURE: 97.7 F | HEART RATE: 84 BPM

## 2024-09-16 DIAGNOSIS — E66.01 MORBID (SEVERE) OBESITY DUE TO EXCESS CALORIES: ICD-10-CM

## 2024-09-16 DIAGNOSIS — G47.33 OBSTRUCTIVE SLEEP APNEA, ADULT: Primary | ICD-10-CM

## 2024-09-16 PROCEDURE — 1159F MED LIST DOCD IN RCRD: CPT | Performed by: NURSE PRACTITIONER

## 2024-09-16 PROCEDURE — 99213 OFFICE O/P EST LOW 20 MIN: CPT | Performed by: NURSE PRACTITIONER

## 2024-09-16 PROCEDURE — 1160F RVW MEDS BY RX/DR IN RCRD: CPT | Performed by: NURSE PRACTITIONER

## 2024-10-10 ENCOUNTER — OFFICE VISIT (OUTPATIENT)
Age: 29
End: 2024-10-10
Payer: MEDICAID

## 2024-10-10 VITALS
SYSTOLIC BLOOD PRESSURE: 124 MMHG | WEIGHT: 315 LBS | OXYGEN SATURATION: 95 % | BODY MASS INDEX: 36.45 KG/M2 | HEART RATE: 76 BPM | DIASTOLIC BLOOD PRESSURE: 78 MMHG | HEIGHT: 78 IN

## 2024-10-10 DIAGNOSIS — R41.840 ATTENTION AND CONCENTRATION DEFICIT: Primary | ICD-10-CM

## 2024-10-10 DIAGNOSIS — F41.9 ANXIETY: ICD-10-CM

## 2024-10-10 RX ORDER — ATOMOXETINE 40 MG/1
40 CAPSULE ORAL DAILY
Qty: 30 CAPSULE | Refills: 1 | Status: SHIPPED | OUTPATIENT
Start: 2024-10-10 | End: 2025-10-10

## 2024-10-10 NOTE — PROGRESS NOTES
Follow Up Office Visit      Patient Name: Amy Silva  : 1995   MRN: [id number]     Referring Provider: Kinga Dye PA-C    Chief Complaint:      ICD-10-CM ICD-9-CM   1. Attention and concentration deficit  R41.840 799.51   2. Anxiety  F41.9 300.00      Answers submitted by the patient for this visit:  Other (Submitted on 10/10/2024)  Please describe your symptoms.: ADHD symptoms  Have you had these symptoms before?: Yes  How long have you been having these symptoms?: Greater than 2 weeks  Please list any medications you are currently taking for this condition.: Bupropion xl  Primary Reason for Visit (Submitted on 10/10/2024)  What is the primary reason for your visit?: Problem Not Listed    History of Present Illness:   Amy Silva is a 29 y.o. male who is here today for follow up and medication management.    Subjective      Patient Reports:  Patient reports he has not seen a difference with focusing and maintaining attention with increase of Wellbutrin. Patient reports in the past few weeks he has made poor impulsive decisions.  Patient reports symptoms negatively impact his occupational functioning.  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 Patient reports some of these decisions have increased feelings of anxiousness.     Patient rates anxiety /10. Patient denies panic attacks. Patient denies mood swings, anger outburst and lashing out.    Patient rates depression /10  Patient denies SI/HI/AVH.    PHQ-9= 8 unchanged  ALIVIA-7= 6 increased score from previous visit    Review of Systems:   Review of Systems   Psychiatric/Behavioral:  Positive for decreased concentration. The patient is nervous/anxious.    All other systems reviewed and are negative.  Sleep pattern: unchanged  Appetite: normal    PHQ-9 Depression Screening  Little interest or pleasure in doing things? 1-->several days   Feeling down, depressed, or hopeless? 1-->several days   Trouble falling or staying asleep, or sleeping too much?  1-->several days   Feeling tired or having little energy? 1-->several days   Poor appetite or overeating? 1-->several days   Feeling bad about yourself - or that you are a failure or have let yourself or your family down? 1-->several days   Trouble concentrating on things, such as reading the newspaper or watching television? 1-->several days   Moving or speaking so slowly that other people could have noticed? Or the opposite - being so fidgety or restless that you have been moving around a lot more than usual? 1-->several days   Thoughts that you would be better off dead, or of hurting yourself in some way?     PHQ-9 Total Score 8   If you checked off any problems, how difficult have these problems made it for you to do your work, take care of things at home, or get along with other people? very difficult     ALIVIA-7 Anxiety Screening  Over the last two weeks, how often have you been bothered by the following problems?  Feeling nervous, anxious or on edge: Several days  Not being able to stop or control worrying: Several days  Worrying too much about different things: Several days  Trouble Relaxing: Several days  Being so restless that it is hard to sit still: Several days  Becoming easily annoyed or irritable: Several days  Feeling afraid as if something awful might happen: Not at all  ALIVIA 7 Total Score: 6  If you checked any problems, how difficult have these problems made it for you to do your work, take care of things at home, or get along with other people: Very difficult    RISK ASSESSMENT:  Patient denies any thoughts or intent of suicide today. Patient denies any impulsive behavior today.     Medications:     Current Outpatient Medications:     buPROPion XL (Wellbutrin XL) 300 MG 24 hr tablet, Take 1 tablet by mouth Every Morning., Disp: 30 tablet, Rfl: 2    doxazosin (CARDURA) 1 MG tablet, TAKE 1 TABLET BY MOUTH EVERY NIGHT FOR BLOOD PRESSURE, Disp: 90 tablet, Rfl: 1    NIFEdipine XL (PROCARDIA XL) 90 MG 24  "hr tablet, Take 1 tablet by mouth Daily. for blood pressure, Disp: 30 tablet, Rfl: 5    sertraline (Zoloft) 50 MG tablet, Take 1 tablet by mouth Daily. For depression and anxiety, Disp: 30 tablet, Rfl: 2    valACYclovir (Valtrex) 500 MG tablet, Take 1 tablet by mouth 2 (Two) Times a Day. As needed for outbreaks, Disp: 10 tablet, Rfl: 11    Medication Considerations:  DAVE reviewed and appropriate.      Allergies:   No Known Allergies    Results Reviewed:     Objective     Physical Exam:  Vital Signs:   Vitals:    10/10/24 0809   Pulse: 76   SpO2: 95%   Weight: (!) 161 kg (355 lb)   Height: 198.1 cm (77.99\")     Body mass index is 41.03 kg/m².     Mental Status Exam:   MENTAL STATUS EXAM   General Appearance:  Cleanly groomed and dressed and well developed  Eye Contact:  Good eye contact  Attitude:  Cooperative  Motor Activity:  Normal gait, posture  Muscle Strength:  Normal  Speech:  Normal rate, tone, volume  Language:  Spontaneous  Mood and affect:  Normal, pleasant  Hopelessness:  Denies  Loneliness: Denies  Thought Process:  Other  Other Comment:  Mind blanking  Associations/ Thought Content:  No delusions  Hallucinations:  None  Suicidal Ideations:  Not present  Homicidal Ideation:  Not present  Sensorium:  Alert and clear  Orientation:  Person, place, time and situation  Immediate Recall, Recent, and Remote Memory:  Intact  Attention Span/ Concentration:  Good  Fund of Knowledge:  Appropriate for age and educational level  Intellectual Functioning:  Average range  Insight:  Good  Judgement:  Good  Reliability:  Good  Impulse Control:  Fair       @RESULASTCBCDIFFPANEL,TSH,LABLIPI,[id number],BCXWIFNP76,MG,FOLATE,PROLACTIN,CRPRESULT,CMP,[id number])@    Lab Results   Component Value Date    GLUCOSE 100 (H) 04/21/2019    BUN 9 04/21/2019    CREATININE 1.16 04/21/2019    BCR 7.8 04/21/2019    K 4.0 04/21/2019    CO2 25.0 04/21/2019    CALCIUM 9.4 04/21/2019    ALBUMIN 4.00 04/21/2019    BILITOT 0.4 04/21/2019    " "AST 24 04/21/2019    ALT 27 04/21/2019       Lab Results   Component Value Date    WBC 6.04 04/21/2019    HGB 15.4 04/21/2019    HCT 46.3 04/21/2019    MCV 86.1 04/21/2019     04/21/2019       No results found for: \"CHOL\", \"CHLPL\", \"TRIG\", \"HDL\", \"LDL\"    Assessment / Plan      Visit Diagnosis/Orders Placed This Visit:  Diagnoses and all orders for this visit:    1. Attention and concentration deficit (Primary)    2. Anxiety       Functional Status: Mild impairment     Prognosis: Good with Ongoing Treatment     Impression/Formulation:  Patient appeared alert and oriented.  Patient is voluntarily requesting to continue outpatient psychiatric treatment at [facility] [address] [facility].  Patient is receptive to assistance with maintaining a stable lifestyle.  Patient presents with history of     ICD-10-CM ICD-9-CM   1. Attention and concentration deficit  R41.840 799.51   2. Anxiety  F41.9 300.00   .    Reviewed patient's previous provider notes. Reviewed most recent labs. Patient meets DSM V diagnostic criteria for diagnoses. Diagnoses may be updated as more information becomes available.       Treatment Plan:   Discontinue Wellbutrin (decrease dose to 150mg for 3-5 days then discontinue)  Initiate Strattera 40mg PO qd  Continue Zoloft 50mg PO qd. No refills needed  Follow up in 6 weeks and as needed    Patient will continue supportive psychotherapy efforts and medications as indicated. Clinic will obtain release of information for current treatment team for continuity of care as needed. Patient will contact this office, call 911 or present to the nearest emergency room should suicidal or homicidal ideations occur.  Discussed medication options and treatment plan of prescribed medication(s) as well as the risks, benefits, and potential side effects. Patient acknowledged and verbally consented to continue with current treatment plan and was educated on the importance of compliance with " treatment and follow-up appointments.     Quality Measures:   Never smoker    I advised Amy Silva of the risks of tobacco use.     Follow Up:   No follow-ups on file.      NABIL Arias  [facility] [address]

## 2024-10-12 DIAGNOSIS — F41.9 ANXIETY: ICD-10-CM

## 2024-10-12 DIAGNOSIS — R41.840 ATTENTION AND CONCENTRATION DEFICIT: ICD-10-CM

## 2024-10-14 RX ORDER — BUPROPION HYDROCHLORIDE 150 MG/1
150 TABLET ORAL EVERY MORNING
Qty: 30 TABLET | Refills: 2 | OUTPATIENT
Start: 2024-10-14

## 2024-10-18 ENCOUNTER — TELEPHONE (OUTPATIENT)
Dept: INTERNAL MEDICINE | Facility: CLINIC | Age: 29
End: 2024-10-18
Payer: MEDICAID

## 2024-10-18 NOTE — TELEPHONE ENCOUNTER
Spoke with pharmacy and the Strattera needs a PA. I will work on that. I advised pt that he could use a [employer] coupon in the meantime to get it and if his insurance won't approve it. He verbalized understanding.

## 2024-10-18 NOTE — TELEPHONE ENCOUNTER
PATIENT WENT TO PHARMACY AND THEY GAVE HIM THE OLD MEDICATION WELLBUTRIN INSTEAD OF THE NEW PRESCRIPTION ATOMOXETINE 40MG THAT LUDY HAD JUST PRESCRIBED HIM.

## 2024-11-04 RX ORDER — VALACYCLOVIR HYDROCHLORIDE 500 MG/1
500 TABLET, FILM COATED ORAL 2 TIMES DAILY
Qty: 10 TABLET | Refills: 11 | Status: SHIPPED | OUTPATIENT
Start: 2024-11-04

## 2024-11-07 RX ORDER — NIFEDIPINE 90 MG/1
90 TABLET, EXTENDED RELEASE ORAL DAILY
Qty: 90 TABLET | Refills: 1 | Status: SHIPPED | OUTPATIENT
Start: 2024-11-07

## 2024-11-21 ENCOUNTER — OFFICE VISIT (OUTPATIENT)
Age: 29
End: 2024-11-21
Payer: MEDICAID

## 2024-11-21 VITALS
SYSTOLIC BLOOD PRESSURE: 122 MMHG | OXYGEN SATURATION: 97 % | DIASTOLIC BLOOD PRESSURE: 80 MMHG | HEART RATE: 80 BPM | BODY MASS INDEX: 41.96 KG/M2 | WEIGHT: 315 LBS

## 2024-11-21 DIAGNOSIS — F90.0 ATTENTION DEFICIT HYPERACTIVITY DISORDER (ADHD), INATTENTIVE TYPE, MODERATE: ICD-10-CM

## 2024-11-21 DIAGNOSIS — F41.9 ANXIETY: Primary | ICD-10-CM

## 2024-11-21 DIAGNOSIS — Z51.81 THERAPEUTIC DRUG MONITORING: ICD-10-CM

## 2024-11-21 RX ORDER — LISDEXAMFETAMINE DIMESYLATE 30 MG/1
30 CAPSULE ORAL EVERY MORNING
Qty: 30 CAPSULE | Refills: 0 | Status: SHIPPED | OUTPATIENT
Start: 2024-11-21

## 2024-11-21 NOTE — PROGRESS NOTES
Follow Up Office Visit      Patient Name: Amy Silva  : 1995   MRN: [id number]     Referring Provider: Kinga Dye PA-C    Chief Complaint:      ICD-10-CM ICD-9-CM   1. Anxiety  F41.9 300.00   2. Attention deficit hyperactivity disorder (ADHD), inattentive type, moderate  F90.0 314.01   3. Therapeutic drug monitoring  Z51.81 V58.83        History of Present Illness:   Amy Silva is a 29 y.o. male who is here today for follow up and medication management.     Answers submitted by the patient for this visit:  Other (Submitted on 2024)  Please describe your symptoms.: ADHD  Have you had these symptoms before?: Yes  How long have you been having these symptoms?: Greater than 2 weeks  Primary Reason for Visit (Submitted on 2024)  What is the primary reason for your visit?: Problem Not Listed    Subjective      Patient Reports:   Patient reports starting Strattera and did not notice improvement. Patient reports doubling the dose on his own and did not notice any improvement with attention and mood. Patient reports it negatively affected his mood. 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 Patient reports decreased motivation and hasn't made it to gym as much as he would like.    Patient rates anxiety 5/10. Patient denies panic attacks. Patient denies mood swings, anger outburst and lashing out.    Patient rates depression 4/10. Patient denies SI/HI/AVH. Patient reports emotions related to external stressors.    PHQ-9= 8 unchanged from previous visit  ALIVIA-7= 7 increased from previous visit    Completed Elli CPT3.  Patient profile of scores and response pattern indicates that he may have issues related to: Inattentiveness (some indication) and impulsivity (some indication).    Review of Systems:   Review of Systems   Psychiatric/Behavioral:  Positive for decreased concentration and sleep disturbance. The patient is nervous/anxious and is hyperactive.    All other  systems reviewed and are negative.  Sleep pattern: difficulty staying asleep, 5 hours per night  Appetite: normal     PHQ-9 Depression Screening  Little interest or pleasure in doing things? Several days   Feeling down, depressed, or hopeless? Not at all   PHQ-2 Total Score 1   Trouble falling or staying asleep, or sleeping too much? Over half   Feeling tired or having little energy? Several days   Poor appetite or overeating? Several days   Feeling bad about yourself - or that you are a failure or have let yourself or your family down? Not at all   Trouble concentrating on things, such as reading the newspaper or watching television? Over half   Moving or speaking so slowly that other people could have noticed? Or the opposite - being so fidgety or restless that you have been moving around a lot more than usual? Several days   Thoughts that you would be better off dead, or of hurting yourself in some way? Not at all   PHQ-9 Total Score 8   If you checked off any problems, how difficult have these problems made it for you to do your work, take care of things at home, or get along with other people? Very difficult       ALIVIA-7 Anxiety Screening  Over the last two weeks, how often have you been bothered by the following problems?  Feeling nervous, anxious or on edge: Several days  Not being able to stop or control worrying: Several days  Worrying too much about different things: Several days  Trouble Relaxing: Several days  Being so restless that it is hard to sit still: Several days  Becoming easily annoyed or irritable: More than half the days  Feeling afraid as if something awful might happen: Not at all  ALIVIA 7 Total Score: 7  If you checked any problems, how difficult have these problems made it for you to do your work, take care of things at home, or get along with other people: Somewhat difficult    RISK ASSESSMENT:  Patient denies any thoughts or intent of suicide today. Patient denies any impulsive behavior  today.     Medications:     Current Outpatient Medications:     doxazosin (CARDURA) 1 MG tablet, TAKE 1 TABLET BY MOUTH EVERY NIGHT FOR BLOOD PRESSURE, Disp: 90 tablet, Rfl: 1    NIFEdipine XL (PROCARDIA XL) 90 MG 24 hr tablet, TAKE 1 TABLET BY MOUTH DAILY FOR BLOOD PRESSURE, Disp: 90 tablet, Rfl: 1    sertraline (Zoloft) 50 MG tablet, Take 1 tablet by mouth Daily. For depression and anxiety, Disp: 30 tablet, Rfl: 2    valACYclovir (Valtrex) 500 MG tablet, Take 1 tablet by mouth 2 (Two) Times a Day. As needed for outbreaks, Disp: 10 tablet, Rfl: 11    lisdexamfetamine (Vyvanse) 30 MG capsule, Take 1 capsule by mouth Every Morning, Disp: 30 capsule, Rfl: 0    Medication Considerations:  DAVE reviewed and appropriate.      Allergies:   No Known Allergies    Results Reviewed:     Objective     Physical Exam:  Vital Signs:   Vitals:    11/21/24 0814   BP: 122/80   Pulse: 80   SpO2: 97%   Weight: (!) 165 kg (363 lb)     Body mass index is 41.96 kg/m².     Mental Status Exam:   MENTAL STATUS EXAM   General Appearance:  Cleanly groomed and dressed and well developed  Eye Contact:  Good eye contact  Attitude:  Cooperative  Motor Activity:  Normal gait, posture and fidgety  Muscle Strength:  Normal  Speech:  Normal rate, tone, volume  Language:  Spontaneous  Mood and affect:  Anxious  Hopelessness:  Denies  Loneliness: Denies  Thought Process:  Logical  Associations/ Thought Content:  No delusions  Hallucinations:  None  Suicidal Ideations:  Not present  Homicidal Ideation:  Not present  Sensorium:  Alert and clear  Orientation:  Person, place, time and situation  Immediate Recall, Recent, and Remote Memory:  Intact  Attention Span/ Concentration:  Easily distracted  Fund of Knowledge:  Appropriate for age and educational level  Intellectual Functioning:  Average range  Insight:  Good  Judgement:  Good  Reliability:  Good  Impulse Control:  Fair    "    @RESULASTCBCDIFFPANEL,TSH,LABLIPI,[id number],OUOERJZU24,MG,FOLATE,PROLACTIN,CRPRESULT,CMP,[id number])@    Lab Results   Component Value Date    GLUCOSE 100 (H) 04/21/2019    BUN 9 04/21/2019    CREATININE 1.16 04/21/2019    BCR 7.8 04/21/2019    K 4.0 04/21/2019    CO2 25.0 04/21/2019    CALCIUM 9.4 04/21/2019    ALBUMIN 4.00 04/21/2019    BILITOT 0.4 04/21/2019    AST 24 04/21/2019    ALT 27 04/21/2019       Lab Results   Component Value Date    WBC 6.04 04/21/2019    HGB 15.4 04/21/2019    HCT 46.3 04/21/2019    MCV 86.1 04/21/2019     04/21/2019       No results found for: \"CHOL\", \"CHLPL\", \"TRIG\", \"HDL\", \"LDL\"    Assessment / Plan      Visit Diagnosis/Orders Placed This Visit:  Diagnoses and all orders for this visit:    1. Anxiety (Primary)    2. Attention deficit hyperactivity disorder (ADHD), inattentive type, moderate  -     lisdexamfetamine (Vyvanse) 30 MG capsule; Take 1 capsule by mouth Every Morning  Dispense: 30 capsule; Refill: 0    3. Therapeutic drug monitoring  -     ToxAssure Flex 23, Ur -; Future  -     ToxAssure Flex 23, Ur - Urine, Clean Catch       Functional Status: Mild impairment     Prognosis: Good with Ongoing Treatment     Impression/Formulation:  Patient appeared alert and oriented.  Patient is voluntarily requesting to continue outpatient psychiatric treatment at [facility] [address] [place] [address].  Patient is receptive to assistance with maintaining a stable lifestyle.  Patient presents with history of     ICD-10-CM ICD-9-CM   1. Anxiety  F41.9 300.00   2. Attention deficit hyperactivity disorder (ADHD), inattentive type, moderate  F90.0 314.01   3. Therapeutic drug monitoring  Z51.81 V58.83   .    Reviewed patient's previous provider notes. Reviewed most recent labs. Patient meets DSM V diagnostic criteria for diagnoses. Diagnoses may be updated as more information becomes available.       Treatment Plan:   Continue Zoloft as prescribed.  Refilled " medication  Initiate Vyvanse 30mg PO qam  Completed Elli CPT3 during visit  Encouraged psychotherapy  Follow-up in 4 weeks and as needed    Patient will continue supportive psychotherapy efforts and medications as indicated. Clinic will obtain release of information for current treatment team for continuity of care as needed. Patient will contact this office, call 911 or present to the nearest emergency room should suicidal or homicidal ideations occur.  Discussed medication options and treatment plan of prescribed medication(s) as well as the risks, benefits, and potential side effects. Patient acknowledged and verbally consented to continue with current treatment plan and was educated on the importance of compliance with treatment and follow-up appointments.     Quality Measures:   Never smoker    I advised Amy Silva of the risks of tobacco use.     Follow Up:   Return in about 4 weeks (around 12/19/2024).      NABIL Arias  [facility] [address]

## 2024-11-26 LAB
1OH-MIDAZOLAM UR QL SCN: NOT DETECTED NG/MG CREAT
6MAM UR QL SCN: NEGATIVE NG/ML
7AMINOCLONAZEPAM/CREAT UR: NOT DETECTED NG/MG CREAT
A-OH ALPRAZ/CREAT UR: NOT DETECTED NG/MG CREAT
A-OH-TRIAZOLAM/CREAT UR CFM: NOT DETECTED NG/MG CREAT
ACP UR QL CFM: NOT DETECTED
ALPRAZ/CREAT UR CFM: NOT DETECTED NG/MG CREAT
AMPHETAMINES UR QL SCN: NEGATIVE NG/ML
APAP UR QL SCN: NEGATIVE UG/ML
BARBITURATES UR QL SCN: NEGATIVE NG/ML
BENZODIAZ SCN METH UR: NEGATIVE
BUPRENORPHINE UR QL SCN: NEGATIVE
BUPRENORPHINE/CREAT UR: NOT DETECTED NG/MG CREAT
CANNABINOIDS UR QL SCN: NEGATIVE NG/ML
CARISOPRODOL UR QL: NEGATIVE NG/ML
CLONAZEPAM/CREAT UR CFM: NOT DETECTED NG/MG CREAT
COCAINE+BZE UR QL SCN: NEGATIVE NG/ML
CREAT UR-MCNC: 172 MG/DL
D-METHORPHAN UR-MCNC: NOT DETECTED NG/ML
D-METHORPHAN+LEVORPHANOL UR QL: NOT DETECTED
DESALKYLFLURAZ/CREAT UR: NOT DETECTED NG/MG CREAT
DIAZEPAM/CREAT UR: NOT DETECTED NG/MG CREAT
ETHANOL UR QL SCN: NEGATIVE G/DL
ETHANOL UR QL SCN: NEGATIVE NG/ML
FENTANYL CTO UR SCN-MCNC: NEGATIVE NG/ML
FENTANYL/CREAT UR: NOT DETECTED NG/MG CREAT
FLUNITRAZEPAM UR QL SCN: NOT DETECTED NG/MG CREAT
GABAPENTIN UR-MCNC: NEGATIVE UG/ML
HALLUCINOGENS UR: NEGATIVE
HYPNOTICS UR QL SCN: NEGATIVE
KETAMINE UR QL: NOT DETECTED
LORAZEPAM/CREAT UR: NOT DETECTED NG/MG CREAT
MEPERIDINE UR QL SCN: NEGATIVE NG/ML
METHADONE UR QL SCN: NEGATIVE NG/ML
METHADONE+METAB UR QL SCN: NEGATIVE NG/ML
MIDAZOLAM/CREAT UR CFM: NOT DETECTED NG/MG CREAT
MISCELLANEOUS, UR: NEGATIVE
NORBUPRENORPHINE/CREAT UR: NOT DETECTED NG/MG CREAT
NORDIAZEPAM/CREAT UR: NOT DETECTED NG/MG CREAT
NORFENTANYL/CREAT UR: NOT DETECTED NG/MG CREAT
NORFLUNITRAZEPAM UR-MCNC: NOT DETECTED NG/MG CREAT
NORKETAMINE UR-MCNC: NOT DETECTED UG/ML
OPIATES UR SCN-MCNC: NEGATIVE NG/ML
OXAZEPAM/CREAT UR: NOT DETECTED NG/MG CREAT
OXYCODONE CTO UR SCN-MCNC: NEGATIVE NG/ML
PCP UR QL SCN: NEGATIVE NG/ML
PRESCRIBED MEDICATIONS: NORMAL
PROPOXYPH UR QL SCN: NEGATIVE NG/ML
TAPENTADOL CTO UR SCN-MCNC: NEGATIVE NG/ML
TEMAZEPAM/CREAT UR: NOT DETECTED NG/MG CREAT
TRAMADOL UR QL SCN: NEGATIVE NG/ML
ZALEPLON UR-MCNC: NOT DETECTED NG/ML
ZOLPIDEM PHENYL-4-CARB UR QL SCN: NOT DETECTED
ZOLPIDEM UR QL SCN: NOT DETECTED
ZOPICLONE-N-OXIDE UR-MCNC: NOT DETECTED NG/ML

## 2024-12-13 ENCOUNTER — PATIENT ROUNDING (BHMG ONLY) (OUTPATIENT)
Dept: FAMILY MEDICINE CLINIC | Facility: CLINIC | Age: 29
End: 2024-12-13
Payer: MEDICAID

## 2024-12-13 ENCOUNTER — OFFICE VISIT (OUTPATIENT)
Dept: FAMILY MEDICINE CLINIC | Facility: CLINIC | Age: 29
End: 2024-12-13
Payer: MEDICAID

## 2024-12-13 VITALS
HEART RATE: 81 BPM | OXYGEN SATURATION: 99 % | BODY MASS INDEX: 36.45 KG/M2 | HEIGHT: 78 IN | DIASTOLIC BLOOD PRESSURE: 82 MMHG | SYSTOLIC BLOOD PRESSURE: 140 MMHG | WEIGHT: 315 LBS | TEMPERATURE: 98.4 F

## 2024-12-13 DIAGNOSIS — F41.9 ANXIETY: ICD-10-CM

## 2024-12-13 DIAGNOSIS — I10 PRIMARY HYPERTENSION: Primary | ICD-10-CM

## 2024-12-13 PROCEDURE — 1126F AMNT PAIN NOTED NONE PRSNT: CPT | Performed by: PHYSICIAN ASSISTANT

## 2024-12-13 PROCEDURE — 1159F MED LIST DOCD IN RCRD: CPT | Performed by: PHYSICIAN ASSISTANT

## 2024-12-13 PROCEDURE — 99213 OFFICE O/P EST LOW 20 MIN: CPT | Performed by: PHYSICIAN ASSISTANT

## 2024-12-13 PROCEDURE — 1160F RVW MEDS BY RX/DR IN RCRD: CPT | Performed by: PHYSICIAN ASSISTANT

## 2024-12-13 RX ORDER — NIFEDIPINE 90 MG/1
90 TABLET, EXTENDED RELEASE ORAL DAILY
Qty: 90 TABLET | Refills: 1 | Status: SHIPPED | OUTPATIENT
Start: 2024-12-13

## 2024-12-13 RX ORDER — DOXAZOSIN 1 MG/1
1 TABLET ORAL NIGHTLY
Qty: 90 TABLET | Refills: 1 | Status: SHIPPED | OUTPATIENT
Start: 2024-12-13

## 2024-12-13 NOTE — PROGRESS NOTES
[facility] a My-Chart message has been sent to the patient for PATIENT ROUNDING with a My chart message

## 2024-12-13 NOTE — PROGRESS NOTES
Subjective   Amy Silva is a 29 y.o. male  Hypertension (Elevated BP readings, was out of BP meds but restarted 12/06 and was started Vyvanse 2 weeks ago)      History of Present Illness  History of Present Illness  The patient is a 29-year-old male who presents today to discuss high blood pressure readings recently while on hypertensive medications.    He experienced an elevation in his blood pressure, which he attributes to a lapse in his medication regimen for approximately one week. During this period, he consumed alcohol over the weekend, further exacerbating his condition. On Monday, he reported feeling unwell with a severe headache, indicative of a significant increase in his blood pressure. Despite resuming his medication, his blood pressure remained elevated throughout the day. He attempted various interventions, including hydration and relaxation, to manage his blood pressure. By Tuesday, his blood pressure had decreased, and he has since been feeling well. He reports no current headaches.    He has been maintaining a regular exercise routine at the gym, attending 3 to 5 days per week, and engaging in 20 to 30 minutes of cardio exercises such as treadmill and bike. He recently started working with a  who advised him to focus on muscle building. His current fitness plan includes 5 days of exercise, with 30 minutes of cardio and the remainder dedicated to weightlifting. He has also begun tracking his caloric intake and aims to consume 250 g of protein daily. He expresses concern about potential interactions between his medications and the supplements recommended by his . He typically takes his Vyvanse in the morning, except on weekends, and usually works out around 5 PM. He is also monitoring his water intake, using a 64-ounce water bottle as a reference.    SOCIAL HISTORY  The patient admits to drinking alcohol over the weekend.    MEDICATIONS  Current: Vyvanse    The following portions of  the patient's history were reviewed and updated as appropriate: allergies, current medications, past social history and problem list    Review of Systems   Constitutional:  Positive for activity change.   Respiratory:  Positive for shortness of breath.    Cardiovascular:  Positive for chest pain. Negative for palpitations.   Neurological:  Positive for headaches.       Objective     Vitals:    12/13/24 1055   BP: 140/82   Pulse: 81   Temp: 98.4 °F (36.9 °C)   SpO2: 99%       Physical Exam  Vitals and nursing note reviewed.   Constitutional:       General: He is not in acute distress.     Appearance: Normal appearance. He is well-developed. He is not ill-appearing, toxic-appearing or diaphoretic.      Comments: BMI42   Neck:      Vascular: No carotid bruit or JVD.   Cardiovascular:      Rate and Rhythm: Normal rate and regular rhythm.      Pulses: Normal pulses.      Heart sounds: Normal heart sounds. No murmur heard.  Pulmonary:      Effort: Pulmonary effort is normal. No respiratory distress.      Breath sounds: Normal breath sounds.   Abdominal:      Palpations: Abdomen is soft.      Tenderness: There is no abdominal tenderness.   Musculoskeletal:         General: No swelling.   Skin:     General: Skin is warm and dry.   Neurological:      Mental Status: He is alert.   Psychiatric:         Mood and Affect: Mood normal.         Behavior: Behavior normal.       Physical Exam  Vital Signs  Patient's weight is 365.    Assessment & Plan   Assessment & Plan  1. Hypertension.  His elevated blood pressure readings are likely due to a combination of alcohol-induced dehydration and a temporary discontinuation of his antihypertensive medication. He is advised to maintain consistent adherence to his prescribed medication regimen. He is also counseled on the potential hypertensive effects of alcohol consumption.    2. Weight management.  He has gained 10 pounds since September 2024, with a 2-pound increase since November 2024.  A comprehensive weight loss strategy was discussed, emphasizing the importance of muscle building through intense weightlifting, a high-protein diet, and adequate rest for muscle recovery. He is advised to avoid excessive weightlifting and to ensure sufficient sleep. The potential benefits and side effects of pre-workout supplements were discussed, and he was reassured that these supplements should not interfere with his current medications. However, he is cautioned against taking Vyvanse concurrently with pre-workout supplements. He is also advised to maintain adequate hydration, aiming for a daily water intake of 1 gallon.    Diagnoses and all orders for this visit:    1. Primary hypertension (Primary)    2. Anxiety  -     sertraline (Zoloft) 50 MG tablet; Take 1 tablet by mouth Daily. For depression and anxiety  Dispense: 90 tablet; Refill: 3    Other orders  -     NIFEdipine XL (PROCARDIA XL) 90 MG 24 hr tablet; Take 1 tablet by mouth Daily. for blood pressure  Dispense: 90 tablet; Refill: 1  -     doxazosin (CARDURA) 1 MG tablet; Take 1 tablet by mouth Every Night.  Dispense: 90 tablet; Refill: 1    Follow-up in 3 months for recheck.    Patient or patient representative verbalized consent for the use of Ambient Listening during the visit with  Kinga Dye PA-C for chart documentation. 12/13/2024  13:44 EST  Answers submitted by the patient for this visit:  Primary Reason for Visit (Submitted on 12/12/2024)  What is the primary reason for your visit?: High Blood Pressure

## 2024-12-19 ENCOUNTER — OFFICE VISIT (OUTPATIENT)
Age: 29
End: 2024-12-19
Payer: MEDICAID

## 2024-12-19 VITALS
WEIGHT: 315 LBS | OXYGEN SATURATION: 98 % | SYSTOLIC BLOOD PRESSURE: 132 MMHG | DIASTOLIC BLOOD PRESSURE: 84 MMHG | HEART RATE: 65 BPM | BODY MASS INDEX: 41.72 KG/M2

## 2024-12-19 DIAGNOSIS — F41.9 ANXIETY: ICD-10-CM

## 2024-12-19 DIAGNOSIS — F90.0 ATTENTION DEFICIT HYPERACTIVITY DISORDER (ADHD), INATTENTIVE TYPE, MODERATE: Primary | ICD-10-CM

## 2024-12-19 RX ORDER — LISDEXAMFETAMINE DIMESYLATE 50 MG/1
50 CAPSULE ORAL EVERY MORNING
Qty: 14 CAPSULE | Refills: 0 | Status: SHIPPED | OUTPATIENT
Start: 2024-12-19

## 2024-12-19 RX ORDER — ATOMOXETINE 40 MG/1
40 CAPSULE ORAL DAILY
Qty: 30 CAPSULE | Refills: 1 | OUTPATIENT
Start: 2024-12-19

## 2024-12-19 NOTE — PROGRESS NOTES
"     Follow Up Office Visit      Patient Name: Amy Silva  : 1995   MRN: [id number]     Referring Provider: Kinga Dye PA-C    Chief Complaint:      ICD-10-CM ICD-9-CM   1. Attention deficit hyperactivity disorder (ADHD), inattentive type, moderate  F90.0 314.01   2. Anxiety  F41.9 300.00        History of Present Illness:   Amy Silva is a 29 y.o. male who is here today for follow up and medication management.      Subjective      Patient Reports:   Patient states \"first day I took the Vyvanse it worked and since then it hasn't worked anymore\". Patient denies any improvement of attention and concentration since the first day.  Patient states \"I'm all over the place and I am not getting things done\".  Patient reports symptoms are affecting his work.  Patient reports he is starting to work out with a  and incorporate a high protein low calorie diet.     Patient rates anxiety 5/10. Patient denies panic attacks. Patient denies mood swings, anger outburst and lashing out.    Patient rates depression 4/10. Patient denies SI/HI/AVH.    PHQ-9= 5 decreased score from previous visit  ALIVIA-7= 6 decreased score from previous visit    Review of Systems:   Review of Systems   Constitutional:  Negative for chills, diaphoresis, fatigue and fever.   HENT:  Negative for congestion and sore throat.    Respiratory:  Negative for cough.    Cardiovascular:  Negative for chest pain.   Gastrointestinal:  Negative for abdominal pain, nausea and vomiting.   Genitourinary:  Negative for dysuria.   Musculoskeletal:  Negative for myalgias and neck pain.   Skin:  Negative for rash.   Neurological:  Negative for weakness, numbness and headaches.   Psychiatric/Behavioral:  Positive for decreased concentration and dysphoric mood. Negative for agitation, sleep disturbance and suicidal ideas. The patient is nervous/anxious.    Sleep pattern: 7 hours per night  Appetite: normal     PHQ-9 Depression Screening  Little " interest or pleasure in doing things? Several days   Feeling down, depressed, or hopeless? Not at all   PHQ-2 Total Score 1   Trouble falling or staying asleep, or sleeping too much? Several days   Feeling tired or having little energy? Not at all   Poor appetite or overeating? Not at all   Feeling bad about yourself - or that you are a failure or have let yourself or your family down? Not at all   Trouble concentrating on things, such as reading the newspaper or watching television? Over half   Moving or speaking so slowly that other people could have noticed? Or the opposite - being so fidgety or restless that you have been moving around a lot more than usual? Several days   Thoughts that you would be better off dead, or of hurting yourself in some way? Not at all   PHQ-9 Total Score 5   If you checked off any problems, how difficult have these problems made it for you to do your work, take care of things at home, or get along with other people? Somewhat difficult       ALIVIA-7 Anxiety Screening  Over the last two weeks, how often have you been bothered by the following problems?  Feeling nervous, anxious or on edge: Several days  Not being able to stop or control worrying: Several days  Worrying too much about different things: Several days  Trouble Relaxing: Several days  Being so restless that it is hard to sit still: Several days  Becoming easily annoyed or irritable: Several days  Feeling afraid as if something awful might happen: Not at all  ALIVIA 7 Total Score: 6  If you checked any problems, how difficult have these problems made it for you to do your work, take care of things at home, or get along with other people: Somewhat difficult    RISK ASSESSMENT:  Patient denies any thoughts or intent of suicide today. Patient denies any impulsive behavior today.     Medications:     Current Outpatient Medications:     doxazosin (CARDURA) 1 MG tablet, Take 1 tablet by mouth Every Night., Disp: 90 tablet, Rfl: 1     NIFEdipine XL (PROCARDIA XL) 90 MG 24 hr tablet, Take 1 tablet by mouth Daily. for blood pressure, Disp: 90 tablet, Rfl: 1    sertraline (Zoloft) 50 MG tablet, Take 1 tablet by mouth Daily. For depression and anxiety, Disp: 90 tablet, Rfl: 3    valACYclovir (Valtrex) 500 MG tablet, Take 1 tablet by mouth 2 (Two) Times a Day. As needed for outbreaks, Disp: 10 tablet, Rfl: 11    lisdexamfetamine (Vyvanse) 50 MG capsule, Take 1 capsule by mouth Every Morning, Disp: 14 capsule, Rfl: 0    Medication Considerations:  DAVE reviewed and appropriate.      Allergies:   No Known Allergies    Results Reviewed:   Yes    Objective     Physical Exam:  Vital Signs:   Vitals:    12/19/24 0809   BP: 132/84   Pulse: 65   SpO2: 98%   Weight: (!) 164 kg (361 lb)     Body mass index is 41.72 kg/m².     Mental Status Exam:   MENTAL STATUS EXAM   General Appearance:  Cleanly groomed and dressed and well developed  Eye Contact:  Good eye contact  Attitude:  Cooperative  Motor Activity:  Normal gait, posture and fidgety  Muscle Strength:  Normal  Speech:  Normal rate, tone, volume  Language:  Spontaneous  Mood and affect:  Normal, pleasant  Hopelessness:  Denies  Loneliness: Denies  Thought Process:  Logical  Associations/ Thought Content:  No delusions  Hallucinations:  None  Suicidal Ideations:  Not present  Homicidal Ideation:  Not present  Sensorium:  Alert and clear  Orientation:  Person, place, time and situation  Immediate Recall, Recent, and Remote Memory:  Intact  Attention Span/ Concentration:  Easily distracted  Fund of Knowledge:  Appropriate for age and educational level  Intellectual Functioning:  Average range  Insight:  Good  Judgement:  Good  Reliability:  Good  Impulse Control:  Fair       @RESULASTCBCDIFFPANEL,TSH,LABLIPI,RNRFYSPS78,WXLCJZMM74,MG,FOLATE,PROLACTIN,CRPRESULT,CMP,Q4WGPWFAUPM)@    Lab Results   Component Value Date    GLUCOSE 100 (H) 04/21/2019    BUN 9 04/21/2019    CREATININE 1.16 04/21/2019    BCR 7.8  "04/21/2019    K 4.0 04/21/2019    CO2 25.0 04/21/2019    CALCIUM 9.4 04/21/2019    ALBUMIN 4.00 04/21/2019    BILITOT 0.4 04/21/2019    AST 24 04/21/2019    ALT 27 04/21/2019       Lab Results   Component Value Date    WBC 6.04 04/21/2019    HGB 15.4 04/21/2019    HCT 46.3 04/21/2019    MCV 86.1 04/21/2019     04/21/2019       No results found for: \"CHOL\", \"CHLPL\", \"TRIG\", \"HDL\", \"LDL\"    Assessment / Plan      Visit Diagnosis/Orders Placed This Visit:  Diagnoses and all orders for this visit:    1. Attention deficit hyperactivity disorder (ADHD), inattentive type, moderate (Primary)  -     lisdexamfetamine (Vyvanse) 50 MG capsule; Take 1 capsule by mouth Every Morning  Dispense: 14 capsule; Refill: 0    2. Anxiety      Functional Status: Mild impairment     Prognosis: Good with Ongoing Treatment     Impression/Formulation:  Patient appeared alert and oriented.  Patient is voluntarily requesting to continue outpatient psychiatric treatment at [facility] [address] [facility].  Patient is receptive to assistance with maintaining a stable lifestyle.  Patient presents with history of     ICD-10-CM ICD-9-CM   1. Attention deficit hyperactivity disorder (ADHD), inattentive type, moderate  F90.0 314.01   2. Anxiety  F41.9 300.00   .    Reviewed patient's previous provider notes. Reviewed most recent labs. Patient meets DSM V diagnostic criteria for diagnoses. Diagnoses may be updated as more information becomes available.       Treatment Plan:   Increase Vyvanse 50mg PO qam.   Continue Zoloft 50mg PO qd. No refills needed  Encouraged psychotherapy  Follow-up in 2 weeks via Telehealth and as needed    Patient will continue supportive psychotherapy efforts and medications as indicated. Clinic will obtain release of information for current treatment team for continuity of care as needed. Patient will contact this office, call 911 or present to the nearest emergency room should suicidal or " homicidal ideations occur.  Discussed medication options and treatment plan of prescribed medication(s) as well as the risks, benefits, and potential side effects. Patient acknowledged and verbally consented to continue with current treatment plan and was educated on the importance of compliance with treatment and follow-up appointments.     Medication options for treatment of ADHD discussed including stimulant and non-stimulant options. Extensive education is provided regarding risks associated with stimulant use including but not limited to:, insomnia, headache, exacerbation of tics, nervousness, irritability, overstimulation, tremor, dizziness, anorexia or change in appetite, nausea, dry mouth, constipation, diarrhea, weight loss, sexual dysfunction, psychotic episodes, seizures, palpitations, tachycardia, hypertension, rare activation (activation of hypomania, efren, and/or suicidal ideations), cardiovascular adverse effects including sudden death. Patient denies any personal or family history of seizures or structural cardiac abnormalities.     Controlled substance agreement reviewed and signed by patient, Patient  is  informed that the medication is to be used by the patient only, the medication is to be used only as directed, and the medication should not be combined with other substances unless directed by a Provider/Prescriber. I advised patients to avoid taking  medication with alcohol or illicit/unprescribed substances., including THC. Patient understands that habitual use of THC while being prescribed a stimulant will result in provider discontinuing stimulant medication due to the cognition dulling effects of marijuana negating the cognitive enhancing action of the stimulant. The patient verbalizes understanding and agreement with this in their own words, and wishes to pursue proposed treatment plan.        Quality Measures:   Never smoker    I advised Amy Silva of the risks of tobacco use.      Follow Up:   Return in about 2 weeks (around 1/2/2025), or tele.      NABIL Arias  [facility] [address]    Answers submitted by the patient for this visit:  Primary Reason for Visit (Submitted on 12/12/2024)  What is the primary reason for your visit?: Problem Not Listed  Problem not listed (Submitted on 12/12/2024)  Chief Complaint: Other medical problem  Reason for appointment: ADHD  anorexia: No  joint pain: No  change in stool: No  joint swelling: No  swollen glands: No  vertigo: No  visual change: No  Onset: at an unknown time  Chronicity: recurrent  Frequency: constantly

## 2025-01-02 ENCOUNTER — TELEMEDICINE (OUTPATIENT)
Age: 30
End: 2025-01-02

## 2025-01-02 DIAGNOSIS — F41.9 ANXIETY: ICD-10-CM

## 2025-01-02 DIAGNOSIS — F90.0 ATTENTION DEFICIT HYPERACTIVITY DISORDER (ADHD), INATTENTIVE TYPE, MODERATE: Primary | ICD-10-CM

## 2025-01-02 RX ORDER — DEXTROAMPHETAMINE SACCHARATE, AMPHETAMINE ASPARTATE MONOHYDRATE, DEXTROAMPHETAMINE SULFATE AND AMPHETAMINE SULFATE 5; 5; 5; 5 MG/1; MG/1; MG/1; MG/1
20 CAPSULE, EXTENDED RELEASE ORAL DAILY
Qty: 30 CAPSULE | Refills: 0 | Status: SHIPPED | OUTPATIENT
Start: 2025-01-02 | End: 2026-01-02

## 2025-01-02 NOTE — PROGRESS NOTES
Video Visit      Patient Name: Amy Silva  : 1995   MRN: [id number]     Referring Provider: Kinga Dye PA-C    Chief Complaint:      ICD-10-CM ICD-9-CM   1. Attention deficit hyperactivity disorder (ADHD), inattentive type, moderate  F90.0 314.01   2. Anxiety  F41.9 300.00        This provider is located at the [facility] (through [facility] [place] [facility]), [address] [place] [address]. [id number] using a secure morphCARDt Video Visit through [employer]. Patient is being seen remotely via telehealth video visit at their home address in [place], and stated they are in a secure environment for this session. The patient's condition being diagnosed/treated is appropriate for telemedicine. The provider identified herself as well as her credentials. The patient, and/or patients guardian, consent to be seen remotely, and when consent is given they understand that the consent allows for patient identifiable information to be sent to a third party as needed. They may refuse to be seen remotely at any time. The electronic data is encrypted and password protected, and the patient and/or guardian has been advised of the potential risks to privacy not withstanding such measures.    The patient has chosen to receive care today through a telehealth video visit. Do you consent to use a video/audio connection for your medical care today? Yes    Mode of Visit: Video  Location of patient: -HOME-  Location of provider: [facility]  You have chosen to receive care through a telehealth visit.  The patient has signed the video visit consent form.  The visit included audio and video interaction. No technical issues occurred during this visit.      History of Present Illness:   Amy Silva is a 29 y.o. male who is being seen by a video visit today for follow up and medication management.       Subjective   Patient Reports:   Patient reports he stopped taking Vyvanse medication two  Right femoral artery angiogram was performed  using a Introducer Shth 6fr 50cm 11cm Grn Hub Snpft Dil by hand injection.  The access site was deemed to be appropriate for closure.  days ago because he was not seeing any positive changes. Patient has trialed Wellbutrin and Strattera in the past with no relief in symptoms.  Patient reports difficulties with attention and concentration daily.  Patient reports symptoms are negatively impacting his work.    Patient reports anxiety and depression are unchanged from previous visit. Patient denies panic attacks. Patient denies mood swings, anger outburst and lashing out. Patient denies SI/HI/AVH.    PHQ-9= will assess at f/u visit. Last completed 12/19  ALIVIA-7= will assess at f/u visit. Last completed 12/19    Review of Systems:   Review of Systems   Constitutional:  Negative for appetite change.   Respiratory:  Negative for chest tightness and shortness of breath.    Cardiovascular:  Negative for chest pain and palpitations.   Gastrointestinal:  Negative for abdominal pain.   Neurological:  Negative for dizziness, seizures and weakness.   Psychiatric/Behavioral:  Positive for decreased concentration.    Sleep pattern: 6-8 hours per night  Appetite: normal     PHQ-9 Depression Screening  Little interest or pleasure in doing things?     Feeling down, depressed, or hopeless?     PHQ-2 Total Score     Trouble falling or staying asleep, or sleeping too much?     Feeling tired or having little energy?     Poor appetite or overeating?     Feeling bad about yourself - or that you are a failure or have let yourself or your family down?     Trouble concentrating on things, such as reading the newspaper or watching television?     Moving or speaking so slowly that other people could have noticed? Or the opposite - being so fidgety or restless that you have been moving around a lot more than usual?     Thoughts that you would be better off dead, or of hurting yourself in some way?     PHQ-9 Total Score     If you checked off any problems, how difficult have these problems made it for you to do your work, take care of things at home, or get along with other  people?         ALIVIA-7 Anxiety Screening       RISK ASSESSMENT:  Patient denies any thoughts of suicide or intent today. Patient denies any suicidal or homicidal ideation today. Patient denies any high risk factors today.     Medications:     Current Outpatient Medications:     amphetamine-dextroamphetamine XR (ADDERALL XR) 20 MG 24 hr capsule, Take 1 capsule by mouth Daily, Disp: 30 capsule, Rfl: 0    doxazosin (CARDURA) 1 MG tablet, Take 1 tablet by mouth Every Night., Disp: 90 tablet, Rfl: 1    NIFEdipine XL (PROCARDIA XL) 90 MG 24 hr tablet, Take 1 tablet by mouth Daily. for blood pressure, Disp: 90 tablet, Rfl: 1    sertraline (Zoloft) 50 MG tablet, Take 1 tablet by mouth Daily. For depression and anxiety, Disp: 90 tablet, Rfl: 3    valACYclovir (Valtrex) 500 MG tablet, Take 1 tablet by mouth 2 (Two) Times a Day. As needed for outbreaks, Disp: 10 tablet, Rfl: 11    Medication Considerations:  DAVE reviewed and appropriate.      Allergies:   No Known Allergies    Objective     Physical Exam:  Vital Signs: There were no vitals filed for this visit.  There is no height or weight on file to calculate BMI.     Mental Status Exam:   Hygiene:   good  Cooperation:  Cooperative  Eye Contact:  Good  Psychomotor Behavior:  Appropriate  Affect:  Full range and Appropriate  Mood: normal  Speech:  Normal  Thought Process:  Goal directed  Thought Content:  Normal  Suicidal:  None  Homicidal:  None  Hallucinations:  None  Delusion:  None  Memory:  Intact  Orientation:  Person, Place, Time, and Situation  Reliability:  good  Insight:  Good  Judgement:  Good  Impulse Control:  Fair  Physical/Medical Issues:  No      Assessment / Plan      Visit Diagnosis/Orders Placed This Visit:  Diagnoses and all orders for this visit:    1. Attention deficit hyperactivity disorder (ADHD), inattentive type, moderate (Primary)  -     amphetamine-dextroamphetamine XR (ADDERALL XR) 20 MG 24 hr capsule; Take 1 capsule by mouth Daily  Dispense: 30  capsule; Refill: 0    2. Anxiety      Functional Status: Mild impairment     Prognosis: Good with Ongoing Treatment     Impression/Formulation:  Patient appeared alert and oriented.  Patient is voluntarily requesting to continue outpatient psychiatric treatment at [facility].  Patient is receptive to assistance with maintaining a stable lifestyle.  Patient presents with history of     ICD-10-CM ICD-9-CM   1. Attention deficit hyperactivity disorder (ADHD), inattentive type, moderate  F90.0 314.01   2. Anxiety  F41.9 300.00   . Reviewed patient's previous provider notes. Reviewed most recent labs. Patient meets DSM V diagnostic criteria for diagnoses. Diagnoses may be updated as more information becomes available.     Treatment Plan:   Discontinue Vyvanse. Patient reports discontinuing medication 2 days ago.  Initiate Adderall XR 20mg PO qam  Continue Zoloft 50 mg p.o. daily.  No refills needed  Follow-up in 4 weeks and as needed    Patient will continue supportive psychotherapy efforts and medications as indicated. Clinic will obtain release of information for current treatment team for continuity of care as needed. Patient will contact this office, call 911 or present to the nearest emergency room should suicidal or homicidal ideations occur. Discussed medication options and treatment plan of prescribed medication(s) as well as the risks, benefits, and potential side effects. Patient ackowledged and verbally consented to continue with current treatment plan and was educated on the importance of compliance with treatment and follow-up appointments.     Medication options for treatment of ADHD discussed including stimulant and non-stimulant options. Extensive education is provided regarding risks associated with stimulant use including but not limited to:, insomnia, headache, exacerbation of tics, nervousness, irritability, overstimulation, tremor, dizziness, anorexia or change in  appetite, nausea, dry mouth, constipation, diarrhea, weight loss, sexual dysfunction, psychotic episodes, seizures, palpitations, tachycardia, hypertension, rare activation (activation of hypomania, efren, and/or suicidal ideations), cardiovascular adverse effects including sudden death. Patient denies any personal or family history of seizures or structural cardiac abnormalities.     Controlled substance agreement reviewed and signed by patient, Patient  is  informed that the medication is to be used by the patient only, the medication is to be used only as directed, and the medication should not be combined with other substances unless directed by a Provider/Prescriber. I advised patients to avoid taking  medication with alcohol or illicit/unprescribed substances., including THC. Patient understands that habitual use of THC while being prescribed a stimulant will result in provider discontinuing stimulant medication due to the cognition dulling effects of marijuana negating the cognitive enhancing action of the stimulant. The patient verbalizes understanding and agreement with this in their own words, and wishes to pursue proposed treatment plan.     Provider discussed medicinal and nonmedicinal treatment options for treatment of anxiety/depression in patient under age 25, including psychotherapy alone, psychotherapy with SSRI, or SSRI without psychotherapy.  Provider discussed the evidence supporting use of psychotherapy to develop coping skills without the risk of medication side effects in addition to efficacy of combined treatment using therapy and medication. Lengthy conversation regarding the influence of hormone fluctuations on mood symptoms, impulsivity and increased risk of worsening symptoms and suicidality with use of  SSRI medication in adolescence.  Patient/Guardian educated on Black Box Warning of increased suicidal thoughts and behaviors occurring with use of SSRIs in those under age 25 and advised  patient must be monitored closely for subtle signs of worsening depression and/or agitation when SSRI medications are initiated. Appropriate safety precautions should be taken including securing firearms and medications out of patient's reach. Provider should be contacted immediately and patient taken to ED should  SI/HI occur. Provider encouraged patient and/or guardian to weigh risks versus benefits of medication management carefully.    Patient and/or guardian verbalize understanding of risks associated with use of SSRI medication and believe medication is their best treatment option at this time. Guardian and/or patient agrees to monitor for and report worsening symptoms or intolerable side effects and understands patient must return for a two week follow up appointment unless otherwise indicated and agreed upon.        Warned about increased risk of serotonin syndrome associated with use of multiple serotonergic medications.  Patient believes benefits outweigh the risks.  Serotonin syndrome signs and symptoms reviewed such as but not limited to: Diarrhea, shivering, tremors, muscle rigidity, headache and confusion.  Patient agrees to go to ED should any of these occur.          Follow Up:   Return in about 4 weeks (around 1/30/2025).      NABIL Arias, PMHNP-[facility] [address]

## 2025-01-30 ENCOUNTER — TELEMEDICINE (OUTPATIENT)
Age: 30
End: 2025-01-30
Payer: COMMERCIAL

## 2025-01-30 DIAGNOSIS — F90.0 ATTENTION DEFICIT HYPERACTIVITY DISORDER (ADHD), INATTENTIVE TYPE, MODERATE: Primary | ICD-10-CM

## 2025-01-30 DIAGNOSIS — F41.9 ANXIETY: ICD-10-CM

## 2025-01-30 RX ORDER — DEXTROAMPHETAMINE SACCHARATE, AMPHETAMINE ASPARTATE MONOHYDRATE, DEXTROAMPHETAMINE SULFATE AND AMPHETAMINE SULFATE 7.5; 7.5; 7.5; 7.5 MG/1; MG/1; MG/1; MG/1
30 CAPSULE, EXTENDED RELEASE ORAL EVERY MORNING
Qty: 21 CAPSULE | Refills: 0 | Status: SHIPPED | OUTPATIENT
Start: 2025-01-30

## 2025-01-30 NOTE — PROGRESS NOTES
Video Visit      Patient Name: Amy Silva  : 1995   MRN: [id number]     Referring Provider: Kinga Dye PA-C    Chief Complaint:      ICD-10-CM ICD-9-CM   1. Attention deficit hyperactivity disorder (ADHD), inattentive type, moderate  F90.0 314.01   2. Anxiety  F41.9 300.00        This provider is located at the [facility] (through [facility]), [address] [place] [address]. [id number] using a secure Lite[employer] Video Visit through [employer]. Patient is being seen remotely via telehealth video visit at their home address in [place], and stated they are in a secure environment for this session. The patient's condition being diagnosed/treated is appropriate for telemedicine. The provider identified herself as well as her credentials. The patient, and/or patients guardian, consent to be seen remotely, and when consent is given they understand that the consent allows for patient identifiable information to be sent to a third party as needed. They may refuse to be seen remotely at any time. The electronic data is encrypted and password protected, and the patient and/or guardian has been advised of the potential risks to privacy not withstanding such measures.    The patient has chosen to receive care today through a telehealth video visit. Do you consent to use a video/audio connection for your medical care today? Yes    Mode of Visit: Video  Location of patient: -OTHER-: Car  Location of provider: +HOME+  You have chosen to receive care through a telehealth visit.  The patient has signed the video visit consent form.  The visit included audio and video interaction. No technical issues occurred during this visit.      History of Present Illness:   Amy Silva is a 29 y.o. male who is being seen by a video visit today for follow up and medication management.     Subjective   Patient Reports:   History of Present Illness  The patient presents via virtual  visit for ADHD, anxiety, and depression.    He reports no discernible improvement in his symptoms with the initiation of Adderall regimen, despite consistent adherence to the medication. He continues to experience significant difficulties with attention and focus, as exemplified by an episode on Monday where he was unable to concentrate or accomplish any tasks. He recalls that the initial dose of Adderall had a noticeable effect, but subsequent doses have not produced the same result. He occasionally consumes pre-workout supplements, which do not seem to have any impact on his condition. He also drinks coffee, which does not appear to have an effect on him.    His mood remains stable with the use of Zoloft. He reports no suicidal ideation, self-harm tendencies, feelings of hopelessness, or loneliness. He rates his anxiety at 4 on a scale of 1 to 10. He experienced a severe depressive episode this week related to his focus issues, which lasted for approximately 4 to 5 hours before subsiding after a gym session. This episode was triggered by his inability to focus, a symptom he has not encountered in a long time. His sleep pattern is inconsistent, with some days being better than others, but he generally manages to get between 6 to 8 hours of sleep. His appetite is satisfactory, although he finds it challenging to meet his caloric and protein needs due to his high-protein, low-calorie diet. He maintains a clean eating habit.    MEDICATIONS  Current: Adderall, Zoloft  Past: Vyvanse    PHQ-9= will assess at F/U visit  ALIVIA-7= will assess at F/U visit    Review of Systems:   Review of Systems   Constitutional:  Negative for appetite change, fatigue and unexpected weight change.   Eyes:  Negative for visual disturbance.   Respiratory:  Negative for chest tightness and shortness of breath.    Cardiovascular:  Negative for chest pain.   Musculoskeletal:  Negative for gait problem.   Skin:  Negative for rash and wound.    Neurological:  Negative for dizziness, tremors, seizures, weakness, light-headedness and headaches.   Psychiatric/Behavioral:  Positive for decreased concentration and dysphoric mood. Negative for agitation, behavioral problems, confusion, hallucinations, self-injury, sleep disturbance and suicidal ideas. The patient is not nervous/anxious and is not hyperactive.    Sleep pattern: 6-8 hours per night  Appetite: high protein, low calorie     RISK ASSESSMENT:  Patient denies any thoughts of suicide or intent today. Patient denies any suicidal or homicidal ideation today. Patient denies any high risk factors today.     Medications:     Current Outpatient Medications:     amphetamine-dextroamphetamine XR (Adderall XR) 30 MG 24 hr capsule, Take 1 capsule by mouth Every Morning, Disp: 21 capsule, Rfl: 0    doxazosin (CARDURA) 1 MG tablet, Take 1 tablet by mouth Every Night., Disp: 90 tablet, Rfl: 1    NIFEdipine XL (PROCARDIA XL) 90 MG 24 hr tablet, Take 1 tablet by mouth Daily. for blood pressure, Disp: 90 tablet, Rfl: 1    sertraline (Zoloft) 50 MG tablet, Take 1 tablet by mouth Daily. For depression and anxiety, Disp: 90 tablet, Rfl: 3    valACYclovir (Valtrex) 500 MG tablet, Take 1 tablet by mouth 2 (Two) Times a Day. As needed for outbreaks, Disp: 10 tablet, Rfl: 11    Medication Considerations:  DAVE reviewed and appropriate.      Allergies:   No Known Allergies    Objective     Physical Exam:  Vital Signs: There were no vitals filed for this visit.  There is no height or weight on file to calculate BMI.     Mental Status Exam:   Hygiene:   good  Cooperation:  Cooperative  Eye Contact:  Good  Psychomotor Behavior:  Appropriate  Affect:  Full range and Appropriate  Mood: normal  Speech:  Normal  Thought Process:  Goal directed and Linear  Thought Content:  Normal  Suicidal:  None  Homicidal:  None  Hallucinations:  None  Delusion:  None  Memory:  Intact  Orientation:  Person, Place, Time, and  "Situation  Reliability:  good  Insight:  Good  Judgement:  Good  Impulse Control:  Fair  Physical/Medical Issues:  No      @RESULASTCBCDIFFPANEL,TSH,LABLIPI,[id number],SAQODTJS52,MG,FOLATE,PROLACTIN,CRPRESULT,CMP,J0TPPOTPPIV)@    Lab Results   Component Value Date    GLUCOSE 100 (H) 04/21/2019    BUN 9 04/21/2019    CREATININE 1.16 04/21/2019    BCR 7.8 04/21/2019    K 4.0 04/21/2019    CO2 25.0 04/21/2019    CALCIUM 9.4 04/21/2019    ALBUMIN 4.00 04/21/2019    BILITOT 0.4 04/21/2019    AST 24 04/21/2019    ALT 27 04/21/2019       Lab Results   Component Value Date    WBC 6.04 04/21/2019    HGB 15.4 04/21/2019    HCT 46.3 04/21/2019    MCV 86.1 04/21/2019     04/21/2019       No results found for: \"CHOL\", \"CHLPL\", \"TRIG\", \"HDL\", \"LDL\"    Assessment / Plan      Visit Diagnosis/Orders Placed This Visit:  Diagnoses and all orders for this visit:    1. Attention deficit hyperactivity disorder (ADHD), inattentive type, moderate (Primary)  -     amphetamine-dextroamphetamine XR (Adderall XR) 30 MG 24 hr capsule; Take 1 capsule by mouth Every Morning  Dispense: 21 capsule; Refill: 0    2. Anxiety       Assessment & Plan  1. Attention Deficit Hyperactivity Disorder (ADHD).  The patient reports that the current dose of Adderall is not effective in managing his symptoms. The dosage of Adderall will be increased to 30 mg daily. He is advised to monitor for any potential side effects such as cardiac symptoms, agitation or irritability and to report any such occurrences. If there is no improvement with the increased dose, alternative medications may be considered.    2. Anxiety.  The patient rates his anxiety at a level 4 out of 10. He reports no significant issues with anxiety currently. He is advised to continue monitoring his anxiety levels and to report any increase in symptoms.    3. Depression.  The patient experienced a depressive episode lasting several hours, triggered by an inability to focus. He is advised to " continue his current medication regimen and to report any increase in the frequency or severity of depressive episodes.      Functional Status: Mild impairment     Prognosis: Good with Ongoing Treatment     Impression/Formulation:  Patient appeared alert and oriented.  Patient is voluntarily requesting to continue outpatient psychiatric treatment at [facility].  Patient is receptive to assistance with maintaining a stable lifestyle.  Patient presents with history of     ICD-10-CM ICD-9-CM   1. Attention deficit hyperactivity disorder (ADHD), inattentive type, moderate  F90.0 314.01   2. Anxiety  F41.9 300.00   . Reviewed patient's previous provider notes. Reviewed most recent labs. Patient meets DSM V diagnostic criteria for diagnoses. Diagnoses may be updated as more information becomes available.     Treatment Plan:   Continue Zoloft 50 mg PO qd. No refills needed  Increase Adderall XR to 30mg PO qam (3 week supply prescribed)  F/U visit will include EKG and labs  Encouraged psychotherapy  Follow up in 4 weeks and as needed    Patient will continue supportive psychotherapy efforts and medications as indicated. Clinic will obtain release of information for current treatment team for continuity of care as needed. Patient will contact this office, call 911 or present to the nearest emergency room should suicidal or homicidal ideations occur. Discussed medication options and treatment plan of prescribed medication(s) as well as the risks, benefits, and potential side effects. Patient ackowledged and verbally consented to continue with current treatment plan and was educated on the importance of compliance with treatment and follow-up appointments.     Medication risks and side effects discussed with patient including risk for worsening mood, changes in behavior, thoughts of suicide or homicide, induction of efren, serotonin syndrome, rare hyponatremia, rare SIADH, withdrawal symptoms if  abrupt withdrawal, sexual dysfunction.   If any thoughts of SI or HI, worsening mood or changes in behavior, call 911 or crisis line [phone], or go to nearest ER at once. Patient agrees to notify close family/friend of new trial of antidepressants/info above. Pt.verbalizes understanding and consents to treatment with this medication.     Medication options for treatment of ADHD discussed including stimulant and non-stimulant options. Extensive education is provided regarding risks associated with stimulant use including but not limited to:, insomnia, headache, exacerbation of tics, nervousness, irritability, overstimulation, tremor, dizziness, anorexia or change in appetite, nausea, dry mouth, constipation, diarrhea, weight loss, sexual dysfunction, psychotic episodes, seizures, palpitations, tachycardia, hypertension, rare activation (activation of hypomania, efren, and/or suicidal ideations), cardiovascular adverse effects including sudden death. Patient denies any personal or family history of seizures or structural cardiac abnormalities.     Controlled substance agreement reviewed and signed by patient, Patient  is  informed that the medication is to be used by the patient only, the medication is to be used only as directed, and the medication should not be combined with other substances unless directed by a Provider/Prescriber. I advised patients to avoid taking  medication with alcohol or illicit/unprescribed substances., including THC. Patient understands that habitual use of THC while being prescribed a stimulant will result in provider discontinuing stimulant medication due to the cognition dulling effects of marijuana negating the cognitive enhancing action of the stimulant. The patient verbalizes understanding and agreement with this in their own words, and wishes to pursue proposed treatment plan.        Quality Measures:   Former smoker    I advised Amy Silva of the risks of tobacco use.       Follow  Up:   Return in about 4 weeks (around 2/27/2025).    Patient or patient representative verbalized consent for the use of Ambient Listening during the visit with  NABIL Arias for chart documentation. 1/30/2025  08:20 EST    NABIL Arias, [facility] [address]

## 2025-02-18 ENCOUNTER — OFFICE VISIT (OUTPATIENT)
Dept: FAMILY MEDICINE CLINIC | Facility: CLINIC | Age: 30
End: 2025-02-18
Payer: COMMERCIAL

## 2025-02-18 VITALS
OXYGEN SATURATION: 95 % | TEMPERATURE: 98.3 F | DIASTOLIC BLOOD PRESSURE: 86 MMHG | WEIGHT: 315 LBS | HEART RATE: 78 BPM | BODY MASS INDEX: 36.45 KG/M2 | HEIGHT: 78 IN | SYSTOLIC BLOOD PRESSURE: 152 MMHG

## 2025-02-18 DIAGNOSIS — R05.9 COUGH, UNSPECIFIED TYPE: Primary | ICD-10-CM

## 2025-02-18 DIAGNOSIS — J06.9 UPPER RESPIRATORY TRACT INFECTION, UNSPECIFIED TYPE: ICD-10-CM

## 2025-02-18 DIAGNOSIS — J02.9 SORE THROAT: ICD-10-CM

## 2025-02-18 LAB
EXPIRATION DATE: NORMAL
EXPIRATION DATE: NORMAL
FLUAV AG UPPER RESP QL IA.RAPID: NOT DETECTED
FLUBV AG UPPER RESP QL IA.RAPID: NOT DETECTED
INTERNAL CONTROL: NORMAL
INTERNAL CONTROL: NORMAL
Lab: NORMAL
Lab: NORMAL
S PYO AG THROAT QL: NEGATIVE
SARS-COV-2 AG UPPER RESP QL IA.RAPID: NOT DETECTED

## 2025-02-18 PROCEDURE — 87428 SARSCOV & INF VIR A&B AG IA: CPT | Performed by: PHYSICIAN ASSISTANT

## 2025-02-18 PROCEDURE — 87880 STREP A ASSAY W/OPTIC: CPT | Performed by: PHYSICIAN ASSISTANT

## 2025-02-18 PROCEDURE — 99213 OFFICE O/P EST LOW 20 MIN: CPT | Performed by: PHYSICIAN ASSISTANT

## 2025-02-18 RX ORDER — AMOXICILLIN 875 MG/1
875 TABLET, COATED ORAL 2 TIMES DAILY
Qty: 14 TABLET | Refills: 0 | Status: SHIPPED | OUTPATIENT
Start: 2025-02-18

## 2025-02-18 RX ORDER — GUAIFENESIN 600 MG/1
1200 TABLET, EXTENDED RELEASE ORAL 2 TIMES DAILY
Qty: 40 TABLET | Refills: 0 | Status: SHIPPED | OUTPATIENT
Start: 2025-02-18

## 2025-02-18 NOTE — PROGRESS NOTES
Subjective   Amy Silva is a 29 y.o. male  Cough (Cough/congestion, sinus congestion) and Sore Throat (Sore throat/PND)      History of Present Illness  History of Present Illness  The patient is a 29-year-old male who is coming in today for evaluation of cough and sore throat.    He reports experiencing a sore throat, which is accompanied by difficulty in swallowing. He also describes intermittent nasal congestion and a persistent cough, which has been productive of mucus. He does not report any febrile episodes, wheezing, or shortness of breath. Additionally, he notes a sensation of stuffiness in his nasal passages. He is not allergic to any antibiotics.    He is currently on medication for hypertension.    ALLERGIES  The patient has no known allergies.    The following portions of the patient's history were reviewed and updated as appropriate: allergies, current medications, past social history and problem list    Review of Systems   Constitutional:  Positive for activity change and fatigue. Negative for fever.   HENT:  Positive for congestion, postnasal drip, rhinorrhea, sneezing, sore throat and voice change. Negative for sinus pressure and trouble swallowing.    Respiratory:  Positive for cough.        Objective     Vitals:    02/18/25 1433   BP: 152/86   Pulse: 78   Temp: 98.3 °F (36.8 °C)   SpO2: 95%       Physical Exam  Vitals and nursing note reviewed.   Constitutional:       General: He is not in acute distress.     Appearance: Normal appearance. He is well-developed. He is ill-appearing. He is not toxic-appearing or diaphoretic.   HENT:      Head: Normocephalic and atraumatic.      Right Ear: External ear normal.      Left Ear: External ear normal.      Mouth/Throat:      Pharynx: No oropharyngeal exudate.   Eyes:      General:         Right eye: No discharge.         Left eye: No discharge.      Conjunctiva/sclera: Conjunctivae normal.   Cardiovascular:      Rate and Rhythm: Normal rate and regular  rhythm.      Heart sounds: Normal heart sounds.   Pulmonary:      Effort: Pulmonary effort is normal. No respiratory distress.      Breath sounds: Normal breath sounds.   Musculoskeletal:      Cervical back: Normal range of motion and neck supple.   Lymphadenopathy:      Cervical: No cervical adenopathy.   Skin:     General: Skin is warm and dry.   Neurological:      Mental Status: He is alert and oriented to person, place, and time.       Physical Exam  Throat appears irritated but not red. No pus is present. Nasal congestion is noted.  Lungs sound congested.    Assessment & Plan   Assessment & Plan  1. Upper respiratory infection.  Symptoms include a sore throat, congestion, and cough. Examination revealed nasal congestion and irritation in the trachea and vocal cords. The patient tested negative for strep, flu, and COVID-19. Over-the-counter analgesics such as Tylenol or Advil are recommended for sore throat relief. Adequate hydration with water, tea, and juice is advised to help break up mucus. He should avoid contact with sick people for the next few days. An antibiotic regimen will be initiated, to be taken twice daily. Additionally, a decongestant will be prescribed, also to be taken twice daily. Prescriptions have been sent to [facility]. If there is no improvement after completing the medication course, he should inform the clinic.    2. Hypertension.  He is currently on medication for hypertension.    Diagnoses and all orders for this visit:    1. Cough, unspecified type (Primary)  -     POCT SARS-CoV-2 Antigen JEREMIAS + Flu    2. Sore throat  -     POCT rapid strep A    3. Upper respiratory tract infection, unspecified type    Other orders  -     guaiFENesin (Mucinex) 600 MG 12 hr tablet; Take 2 tablets by mouth 2 (Two) Times a Day. For congestion  Dispense: 40 tablet; Refill: 0  -     amoxicillin (AMOXIL) 875 MG tablet; Take 1 tablet by mouth 2 (Two) Times a Day.  Dispense: 14 tablet; Refill: 0          Patient or patient representative verbalized consent for the use of Ambient Listening during the visit with  Kinga Dye PA-C for chart documentation. 2/18/2025  15:18 EST

## 2025-03-04 ENCOUNTER — OFFICE VISIT (OUTPATIENT)
Age: 30
End: 2025-03-04
Payer: COMMERCIAL

## 2025-03-04 VITALS
OXYGEN SATURATION: 97 % | SYSTOLIC BLOOD PRESSURE: 136 MMHG | WEIGHT: 315 LBS | BODY MASS INDEX: 36.45 KG/M2 | HEIGHT: 78 IN | DIASTOLIC BLOOD PRESSURE: 80 MMHG | HEART RATE: 88 BPM

## 2025-03-04 DIAGNOSIS — F90.0 ATTENTION DEFICIT HYPERACTIVITY DISORDER (ADHD), INATTENTIVE TYPE, MODERATE: Primary | ICD-10-CM

## 2025-03-04 DIAGNOSIS — F33.0 MILD EPISODE OF RECURRENT MAJOR DEPRESSIVE DISORDER: ICD-10-CM

## 2025-03-04 DIAGNOSIS — F41.9 ANXIETY: ICD-10-CM

## 2025-03-04 RX ORDER — DEXTROAMPHETAMINE SACCHARATE, AMPHETAMINE ASPARTATE MONOHYDRATE, DEXTROAMPHETAMINE SULFATE AND AMPHETAMINE SULFATE 5; 5; 5; 5 MG/1; MG/1; MG/1; MG/1
40 CAPSULE, EXTENDED RELEASE ORAL DAILY
Qty: 60 CAPSULE | Refills: 0 | Status: SHIPPED | OUTPATIENT
Start: 2025-03-04 | End: 2026-03-04

## 2025-03-04 NOTE — PROGRESS NOTES
Follow Up Office Visit      Patient Name: Amy Silva  : 1995   MRN: [id number]     Referring Provider: Kinga Dye PA-C    Chief Complaint:      ICD-10-CM ICD-9-CM   1. Attention deficit hyperactivity disorder (ADHD), inattentive type, moderate  F90.0 314.01   2. Anxiety  F41.9 300.00   3. Mild episode of recurrent major depressive disorder  F33.0 296.31        History of Present Illness:   Amy Silva is a 29 y.o. male who is here today for follow up and medication management.           Subjective      Patient Reports:   History of Present Illness  He reports not noticing a significant improvement from increasing dose of Adderall from 20 mg to 30 mg.  He reports his work schedule includes starting work around 9 AM and continuing until 3:30 PM.  He reports on the first day of taking increased dose, he may have experienced some improvement with focus and attention but not enough to benefit.  He struggles with starting and completing tasks.  He reports this severely impacts his work.  He reports being on his current Zoloft regimen for an extended period of time. He reports no panic attacks or anger outbursts. He attributes his anxious tendencies to difficulty in focusing. He rates his anxious feelings at a moderate level of 5 out of 10. He does not perceive himself as an anxious individual. He maintains a regular exercise routine and a balanced diet. He recently acquired new glasses.    He experiences depressive episodes approximately every few months, a frequency that has decreased from once a month since starting Zoloft. He reports these episodes can be moderate to severe, at a level of 7 or 8 out of 10. He reports no suicidal or homicidal ideation, self-harm tendencies, or hallucinations. He does not feel hopeless or lonely.    His sleep pattern is inconsistent, with some nights achieving 6 to 8 hours of sleep. He has an upcoming appointment with a sleep specialist scheduled for the end of  the year. He is currently attempting to establish a healthier sleep schedule, having shifted his gym routine from morning to evening due to a change in trainers. He typically works from 9 AM to 3:30 PM, during which he requires maximum focus and attention. He works 5 days a week.    MEDICATIONS  Current: Zoloft, Adderall    PHQ-9= 8 increased score from previous visit  ALIVIA-7= 5 decreased score from previous visit    Review of Systems:   Review of Systems   Constitutional:  Negative for appetite change, chills, diaphoresis, fatigue, fever and unexpected weight change.   HENT:  Negative for congestion and sore throat.    Eyes:  Negative for visual disturbance.   Respiratory:  Negative for cough, chest tightness and shortness of breath.    Cardiovascular:  Negative for chest pain.   Gastrointestinal:  Negative for abdominal pain, nausea and vomiting.   Genitourinary:  Negative for dysuria.   Musculoskeletal:  Negative for gait problem, myalgias and neck pain.   Skin:  Negative for rash and wound.   Neurological:  Negative for dizziness, tremors, seizures, weakness, light-headedness, numbness and headaches.   Psychiatric/Behavioral:  Positive for decreased concentration and dysphoric mood. Negative for agitation, behavioral problems, confusion, hallucinations, self-injury, sleep disturbance and suicidal ideas. The patient is nervous/anxious. The patient is not hyperactive.    Sleep pattern: 6-8 hours per night   Appetite: normal     PHQ-9 Depression Screening  Little interest or pleasure in doing things? Several days   Feeling down, depressed, or hopeless? Several days   PHQ-2 Total Score 2   Trouble falling or staying asleep, or sleeping too much? Several days   Feeling tired or having little energy? Several days   Poor appetite or overeating? Several days   Feeling bad about yourself - or that you are a failure or have let yourself or your family down? Not at all   Trouble concentrating on things, such as reading the  newspaper or watching television? Over half   Moving or speaking so slowly that other people could have noticed? Or the opposite - being so fidgety or restless that you have been moving around a lot more than usual? Several days   Thoughts that you would be better off dead, or of hurting yourself in some way? Not at all   PHQ-9 Total Score 8   If you checked off any problems, how difficult have these problems made it for you to do your work, take care of things at home, or get along with other people? Somewhat difficult       ALIVIA-7 Anxiety Screening  Over the last two weeks, how often have you been bothered by the following problems?  Feeling nervous, anxious or on edge: Several days  Not being able to stop or control worrying: Not at all  Worrying too much about different things: Several days  Trouble Relaxing: Several days  Being so restless that it is hard to sit still: Several days  Becoming easily annoyed or irritable: Several days  Feeling afraid as if something awful might happen: Not at all  ALIVIA 7 Total Score: 5  If you checked any problems, how difficult have these problems made it for you to do your work, take care of things at home, or get along with other people: Somewhat difficult    RISK ASSESSMENT:  Patient denies any thoughts or intent of suicide today. Patient denies any impulsive behavior today.     Medications:     Current Outpatient Medications:     amoxicillin (AMOXIL) 875 MG tablet, Take 1 tablet by mouth 2 (Two) Times a Day., Disp: 14 tablet, Rfl: 0    doxazosin (CARDURA) 1 MG tablet, Take 1 tablet by mouth Every Night., Disp: 90 tablet, Rfl: 1    guaiFENesin (Mucinex) 600 MG 12 hr tablet, Take 2 tablets by mouth 2 (Two) Times a Day. For congestion, Disp: 40 tablet, Rfl: 0    NIFEdipine XL (PROCARDIA XL) 90 MG 24 hr tablet, Take 1 tablet by mouth Daily. for blood pressure, Disp: 90 tablet, Rfl: 1    sertraline (Zoloft) 50 MG tablet, Take 1 tablet by mouth Daily. For depression and anxiety,  "Disp: 90 tablet, Rfl: 3    valACYclovir (Valtrex) 500 MG tablet, Take 1 tablet by mouth 2 (Two) Times a Day. As needed for outbreaks, Disp: 10 tablet, Rfl: 11    amphetamine-dextroamphetamine XR (ADDERALL XR) 20 MG 24 hr capsule, Take 2 capsules by mouth Daily, Disp: 60 capsule, Rfl: 0    Medication Considerations:  DAVE reviewed and appropriate.      Allergies:   No Known Allergies    Results Reviewed:   Yes     Objective     Physical Exam:  Vital Signs:   Vitals:    03/04/25 0810   BP: 136/80   Pulse: 88   SpO2: 97%   Weight: (!) 161 kg (354 lb)   Height: 198.1 cm (77.99\")     Body mass index is 40.92 kg/m².     Mental Status Exam:   MENTAL STATUS EXAM   General Appearance:  Cleanly groomed and dressed and well developed  Eye Contact:  Good eye contact  Attitude:  Cooperative  Motor Activity:  Normal gait, posture, fidgety and restless  Muscle Strength:  Normal  Speech:  Normal rate, tone, volume  Language:  Spontaneous  Mood and affect:  Frustrated and anxious  Hopelessness:  Denies  Loneliness: Denies  Thought Process:  Logical  Associations/ Thought Content:  No delusions  Hallucinations:  None  Suicidal Ideations:  Not present  Homicidal Ideation:  Not present  Sensorium:  Alert and clear  Orientation:  Person, place, time and situation  Immediate Recall, Recent, and Remote Memory:  Intact  Attention Span/ Concentration:  Good  Fund of Knowledge:  Appropriate for age and educational level  Intellectual Functioning:  Average range  Insight:  Good  Judgement:  Good  Reliability:  Good  Impulse Control:  Fair       @RESULASTCBCDIFFPANEL,TSH,LABLIPI,GJPGSLBQ52,VFXSOBXC27,MG,FOLATE,PROLACTIN,CRPRESULT,CMP,X6JGCVWTSWT)@    Lab Results   Component Value Date    GLUCOSE 100 (H) 04/21/2019    BUN 9 04/21/2019    CREATININE 1.16 04/21/2019    BCR 7.8 04/21/2019    K 4.0 04/21/2019    CO2 25.0 04/21/2019    CALCIUM 9.4 04/21/2019    ALBUMIN 4.00 04/21/2019    BILITOT 0.4 04/21/2019    AST 24 04/21/2019    ALT 27 " "04/21/2019       Lab Results   Component Value Date    WBC 6.04 04/21/2019    HGB 15.4 04/21/2019    HCT 46.3 04/21/2019    MCV 86.1 04/21/2019     04/21/2019       No results found for: \"CHOL\", \"CHLPL\", \"TRIG\", \"HDL\", \"LDL\"    Assessment / Plan      Visit Diagnosis/Orders Placed This Visit:  Diagnoses and all orders for this visit:    1. Attention deficit hyperactivity disorder (ADHD), inattentive type, moderate (Primary)  -     amphetamine-dextroamphetamine XR (ADDERALL XR) 20 MG 24 hr capsule; Take 2 capsules by mouth Daily  Dispense: 60 capsule; Refill: 0    2. Anxiety    3. Mild episode of recurrent major depressive disorder       Assessment & Plan  1. Anxiety.  He does not experience panic attacks or anger outbursts. He rates his anxious feelings at a 5 out of 10. The current treatment plan will be maintained. If symptoms persist, further evaluation will be considered.    2. Depression.  He experiences low moments every few months, which have improved from once a month since starting Zoloft. He rates his down days at a 7-8 out of 10. He does not have thoughts of suicide, self-harm, or hallucinations. The current treatment plan will be maintained. If the severity of his symptoms increases, he should inform the provider immediately.    3. Sleep issues.  He reports getting 6-8 hours of sleep on some nights. He has a sleep medicine appointment scheduled for the end of the year.    4.  ADHD  He reports difficulty starting and completing tasks.  He reports no significant improvement with increased of Adderall medication.      Functional Status: Mild impairment     Prognosis: Good with Ongoing Treatment     Impression/Formulation:  Patient appeared alert and oriented.  Patient is voluntarily requesting to continue outpatient psychiatric treatment at [facility] [address] [facility].  Patient is receptive to assistance with maintaining a stable lifestyle.  Patient presents with history of "     ICD-10-CM ICD-9-CM   1. Attention deficit hyperactivity disorder (ADHD), inattentive type, moderate  F90.0 314.01   2. Anxiety  F41.9 300.00   3. Mild episode of recurrent major depressive disorder  F33.0 296.31   .    Reviewed patient's previous provider notes. Reviewed most recent labs. Patient meets DSM V diagnostic criteria for diagnoses. Diagnoses may be updated as more information becomes available.       Treatment Plan:   Continue Zoloft 50 mg PO qd. no refills needed  Increase Adderall XR to 40mg PO qam  Discussed future lab draws and discussed Adderall formulary (IR versus XR)  Encouraged psychotherapy  Follow-up in 4 weeksand as needed    Patient will continue supportive psychotherapy efforts and medications as indicated. Clinic will obtain release of information for current treatment team for continuity of care as needed. Patient will contact this office, call 911 or present to the nearest emergency room should suicidal or homicidal ideations occur.  Discussed medication options and treatment plan of prescribed medication(s) as well as the risks, benefits, and potential side effects. Patient acknowledged and verbally consented to continue with current treatment plan and was educated on the importance of compliance with treatment and follow-up appointments.     Medication risks and side effects discussed with patient including risk for worsening mood, changes in behavior, thoughts of suicide or homicide, induction of efren, serotonin syndrome, rare hyponatremia, rare SIADH, withdrawal symptoms if abrupt withdrawal, sexual dysfunction.   If any thoughts of SI or HI, worsening mood or changes in behavior, call 911 or crisis line [phone], or go to nearest ER at once. Patient agrees to notify close family/friend of new trial of antidepressants/info above. Pt.verbalizes understanding and consents to treatment with this medication.    Medication options for treatment of ADHD discussed including stimulant and  non-stimulant options. Extensive education is provided regarding risks associated with stimulant use including but not limited to:, insomnia, headache, exacerbation of tics, nervousness, irritability, overstimulation, tremor, dizziness, anorexia or change in appetite, nausea, dry mouth, constipation, diarrhea, weight loss, sexual dysfunction, psychotic episodes, seizures, palpitations, tachycardia, hypertension, rare activation (activation of hypomania, efren, and/or suicidal ideations), cardiovascular adverse effects including sudden death. Patient denies any personal or family history of seizures or structural cardiac abnormalities.     Controlled substance agreement reviewed and signed by patient, Patient  is  informed that the medication is to be used by the patient only, the medication is to be used only as directed, and the medication should not be combined with other substances unless directed by a Provider/Prescriber. I advised patients to avoid taking  medication with alcohol or illicit/unprescribed substances., including THC. Patient understands that habitual use of THC while being prescribed a stimulant will result in provider discontinuing stimulant medication due to the cognition dulling effects of marijuana negating the cognitive enhancing action of the stimulant. The patient verbalizes understanding and agreement with this in their own words, and wishes to pursue proposed treatment plan.        Quality Measures:   Former smoker    I advised Amy Silva of the risks of tobacco use.     Follow Up:   Return in about 4 weeks (around 4/1/2025) for tele.      Patient or patient representative verbalized consent for the use of Ambient Listening during the visit with  NABIL Arias for chart documentation. 3/4/2025  08:06 EST    NABIL Arias  [facility] [address]    Answers submitted by the patient for this visit:  Problem not listed (Submitted on 2/25/2025)  Chief  Complaint: Other medical problem  Reason for appointment: ADHD  anorexia: No  joint pain: No  change in stool: No  joint swelling: No  swollen glands: No  vertigo: No  visual change: No  Onset: at an unknown time  Chronicity: chronic  Frequency: constantly

## 2025-03-19 ENCOUNTER — TELEPHONE (OUTPATIENT)
Dept: INTERNAL MEDICINE | Facility: CLINIC | Age: 30
End: 2025-03-19
Payer: COMMERCIAL

## 2025-03-19 ENCOUNTER — TELEPHONE (OUTPATIENT)
Age: 30
End: 2025-03-19
Payer: COMMERCIAL

## 2025-03-19 NOTE — TELEPHONE ENCOUNTER
Advised patient via my chart that the  insurance company has approved the medication  DEXTROAMP-AMPHET  ER 20 mg capsule. This authorization is effective from 09/19/25 to 03/18/26 . A copy of this  letter will be scanned into the chart .

## 2025-03-19 NOTE — TELEPHONE ENCOUNTER
Called insurance to initiate PA over the phone. PA has been submitted and PA # is [id number].     Per representative, approval for medication may take 24-72hours. Patient and pharmacy will be notified of determination.

## 2025-03-19 NOTE — TELEPHONE ENCOUNTER
Patient called in stating he need a prior authorization on his Adderall XR 40mg. Niyah increase his dose at last appointment.     I told patient I would initiate the PA, but it may not have a determination for a few days.   Patient states he is currently out of his medication. I told him I would let you know, and see if there is anything we can do in the interim, possibly call in a short-term script until PA is approved.     Patient understood and will call back with any other issues.      Initiated PA via CMM (key:[id number]) and it stated that PA is  not needed at this time. So I called pharmacy to confirm, and they stated a PA is needed. I will call insurance and try to initiate that way.

## 2025-03-24 NOTE — TELEPHONE ENCOUNTER
Called pharmacy to see if patient's PA has been approved and if patient has been able to  medications yet. Per pharmacist, PA has been approved, and they will get medication ready for . Calling the patient to notify him.

## 2025-04-01 ENCOUNTER — TELEMEDICINE (OUTPATIENT)
Age: 30
End: 2025-04-01
Payer: COMMERCIAL

## 2025-04-01 DIAGNOSIS — F33.0 MILD EPISODE OF RECURRENT MAJOR DEPRESSIVE DISORDER: ICD-10-CM

## 2025-04-01 DIAGNOSIS — F41.9 ANXIETY: ICD-10-CM

## 2025-04-01 DIAGNOSIS — F90.0 ATTENTION DEFICIT HYPERACTIVITY DISORDER (ADHD), INATTENTIVE TYPE, MODERATE: Primary | ICD-10-CM

## 2025-04-01 NOTE — PROGRESS NOTES
"     Video Visit      Patient Name: Amy Silva  : 1995   MRN: [id number]     Referring Provider: Kinga Dye PA-C    Chief Complaint:      ICD-10-CM ICD-9-CM   1. Attention deficit hyperactivity disorder (ADHD), inattentive type, moderate  F90.0 314.01   2. Anxiety  F41.9 300.00   3. Mild episode of recurrent major depressive disorder  F33.0 296.31        Mode of Visit: Video  Location of patient: -HOME-  Location of provider: +HOME+  You have chosen to receive care through a telehealth visit.  The patient has signed the video visit consent form.  The visit included audio and video interaction. No technical issues occurred during this visit.      History of Present Illness:   Amy Silva is a 29 y.o. male who is being seen by a video visit today for follow up and medication management.           Subjective   Patient Reports:   History of Present Illness  He reports a perceived improvement in his condition following the increase in Adderall dosage, although he acknowledges the need for closer monitoring. He states \"I believe it is helping\". He experienced a transient episode of emotional suppression, characterized by an absence of sadness or any other specific emotion, but this was not a consistent occurrence. He reports this may have just been something he wasn't use to because it has not happened again. He currently does not experience emotional suppression. His sleep pattern is generally within the 6 to 8-hour range, with occasional difficulties in falling asleep. He maintains a good appetite, with the exception of one or two days where he experienced a lack of hunger. He reports he is still continuing his workout routine. He reports no physical symptoms such as chest pain or palpitations. He also reports no suicidal or homicidal ideation, self-harm tendencies, feelings of hopelessness, or loneliness.    MEDICATIONS  Adderall    Review of Systems:   Review of Systems   Constitutional:  Negative " for appetite change, chills, diaphoresis, fatigue, fever and unexpected weight change.   HENT:  Negative for congestion and sore throat.    Eyes:  Negative for visual disturbance.   Respiratory:  Negative for cough, chest tightness and shortness of breath.    Cardiovascular:  Negative for chest pain.   Gastrointestinal:  Negative for abdominal pain, nausea and vomiting.   Genitourinary:  Negative for dysuria.   Musculoskeletal:  Negative for gait problem, myalgias and neck pain.   Skin:  Negative for rash and wound.   Neurological:  Negative for dizziness, tremors, seizures, weakness, light-headedness, numbness and headaches.   Psychiatric/Behavioral:  Negative for agitation, behavioral problems, confusion, decreased concentration, dysphoric mood, hallucinations, self-injury, sleep disturbance and suicidal ideas. The patient is not nervous/anxious and is not hyperactive.    Sleep pattern: 6-8 hours per night   Appetite: normal      RISK ASSESSMENT:  Patient denies any thoughts of suicide or intent today. Patient denies any suicidal or homicidal ideation today. Patient denies any high risk factors today.     Medications:     Current Outpatient Medications:     amoxicillin (AMOXIL) 875 MG tablet, Take 1 tablet by mouth 2 (Two) Times a Day., Disp: 14 tablet, Rfl: 0    amphetamine-dextroamphetamine XR (ADDERALL XR) 20 MG 24 hr capsule, Take 2 capsules by mouth Daily, Disp: 60 capsule, Rfl: 0    doxazosin (CARDURA) 1 MG tablet, Take 1 tablet by mouth Every Night., Disp: 90 tablet, Rfl: 1    guaiFENesin (Mucinex) 600 MG 12 hr tablet, Take 2 tablets by mouth 2 (Two) Times a Day. For congestion, Disp: 40 tablet, Rfl: 0    NIFEdipine XL (PROCARDIA XL) 90 MG 24 hr tablet, Take 1 tablet by mouth Daily. for blood pressure, Disp: 90 tablet, Rfl: 1    sertraline (Zoloft) 50 MG tablet, Take 1 tablet by mouth Daily. For depression and anxiety, Disp: 90 tablet, Rfl: 3    valACYclovir (Valtrex) 500 MG tablet, Take 1 tablet by mouth 2  (Two) Times a Day. As needed for outbreaks, Disp: 10 tablet, Rfl: 11    Medication Considerations:  DAVE reviewed and appropriate.      Allergies:   No Known Allergies    Objective     Physical Exam:  Vital Signs: There were no vitals filed for this visit.  There is no height or weight on file to calculate BMI.   The patient was seen remotely today via a MyChart Video Visit through [facility].  Unable to obtain vital signs due to nature of remote visit.  Height stated at 6'5 inches.  Weight stated at 350 pounds.     Mental Status Exam:   MENTAL STATUS EXAM   General Appearance:  Cleanly groomed and dressed and well developed  Eye Contact:  Good eye contact  Attitude:  Cooperative  Motor Activity:  Normal gait, posture  Muscle Strength:  Normal  Speech:  Normal rate, tone, volume  Language:  Spontaneous  Mood and affect:  Normal, pleasant  Hopelessness:  Denies  Loneliness: Denies  Thought Process:  Logical  Associations/ Thought Content:  No delusions  Hallucinations:  None  Suicidal Ideations:  Not present  Homicidal Ideation:  Not present  Sensorium:  Alert and clear  Orientation:  Person, place, time and situation  Immediate Recall, Recent, and Remote Memory:  Intact  Attention Span/ Concentration:  Good  Fund of Knowledge:  Appropriate for age and educational level  Intellectual Functioning:  Average range  Insight:  Good  Judgement:  Good  Reliability:  Good  Impulse Control:  Fair       @RESULASTCBCDIFFPANEL,TSH,LABLIPI,[id number],[id number],MG,FOLATE,PROLACTIN,CRPRESULT,CMP,[id number])@    Lab Results   Component Value Date    GLUCOSE 100 (H) 04/21/2019    BUN 9 04/21/2019    CREATININE 1.16 04/21/2019    BCR 7.8 04/21/2019    K 4.0 04/21/2019    CO2 25.0 04/21/2019    CALCIUM 9.4 04/21/2019    ALBUMIN 4.00 04/21/2019    BILITOT 0.4 04/21/2019    AST 24 04/21/2019    ALT 27 04/21/2019       Lab Results   Component Value Date    WBC 6.04 04/21/2019    HGB 15.4 04/21/2019    HCT 46.3 04/21/2019    MCV 86.1  "04/21/2019     04/21/2019       No results found for: \"CHOL\", \"CHLPL\", \"TRIG\", \"HDL\", \"LDL\"    Assessment / Plan      Visit Diagnosis/Orders Placed This Visit:  Diagnoses and all orders for this visit:    1. Attention deficit hyperactivity disorder (ADHD), inattentive type, moderate (Primary)    2. Anxiety    3. Mild episode of recurrent major depressive disorder       Assessment & Plan  1. Anxiety.  The patient reports that the increased dose of Adderall has been helping, although he experienced some emotional suppression for a day or two. He will monitor his emotional state and appetite closely. He reports no chest pain, palpitations, thoughts of suicide, self-harm, hopelessness, or loneliness. The current dose of Adderall will be maintained.    2. Depression.  The patient reports feeling emotionally suppressed initially but not consistently. He will monitor his emotional state and report any persistent numbness or other concerning symptoms. No changes to the current medication regimen are planned at this time.      Functional Status: Mild impairment     Prognosis: Good with Ongoing Treatment     Impression/Formulation:  Patient appeared alert and oriented.  Patient is voluntarily requesting to continue outpatient psychiatric treatment at [facility].  Patient is receptive to assistance with maintaining a stable lifestyle.  Patient presents with history of     ICD-10-CM ICD-9-CM   1. Attention deficit hyperactivity disorder (ADHD), inattentive type, moderate  F90.0 314.01   2. Anxiety  F41.9 300.00   3. Mild episode of recurrent major depressive disorder  F33.0 296.31   . Reviewed patient's previous provider notes. Reviewed most recent labs. Patient meets DSM V diagnostic criteria for diagnoses. Diagnoses may be updated as more information becomes available.     Treatment Plan:   Continue Zoloft 50 mg PO qd and Adderall XR to 40mg PO qam. No refills needed  Encouraged " psychotherapy  Follow up in 6 week and as needed    Patient will continue supportive psychotherapy efforts and medications as indicated. Clinic will obtain release of information for current treatment team for continuity of care as needed. Patient will contact this office, call 911 or present to the nearest emergency room should suicidal or homicidal ideations occur. Discussed medication options and treatment plan of prescribed medication(s) as well as the risks, benefits, and potential side effects. Patient ackowledged and verbally consented to continue with current treatment plan and was educated on the importance of compliance with treatment and follow-up appointments.     Medication options for treatment of ADHD discussed including stimulant and non-stimulant options. Extensive education is provided regarding risks associated with stimulant use including but not limited to:, insomnia, headache, exacerbation of tics, nervousness, irritability, overstimulation, tremor, dizziness, anorexia or change in appetite, nausea, dry mouth, constipation, diarrhea, weight loss, sexual dysfunction, psychotic episodes, seizures, palpitations, tachycardia, hypertension, rare activation (activation of hypomania, efren, and/or suicidal ideations), cardiovascular adverse effects including sudden death. Patient denies any personal or family history of seizures or structural cardiac abnormalities.     Controlled substance agreement reviewed and signed by patient, Patient  is  informed that the medication is to be used by the patient only, the medication is to be used only as directed, and the medication should not be combined with other substances unless directed by a Provider/Prescriber. I advised patients to avoid taking  medication with alcohol or illicit/unprescribed substances., including THC. Patient understands that habitual use of THC while being prescribed a stimulant will result in provider discontinuing stimulant  medication due to the cognition dulling effects of marijuana negating the cognitive enhancing action of the stimulant. The patient verbalizes understanding and agreement with this in their own words, and wishes to pursue proposed treatment plan.     Medication risks and side effects discussed with patient including risk for worsening mood, changes in behavior, thoughts of suicide or homicide, induction of efren, serotonin syndrome, rare hyponatremia, rare SIADH, withdrawal symptoms if abrupt withdrawal, sexual dysfunction.   If any thoughts of SI or HI, worsening mood or changes in behavior, call 911 or crisis line [phone], or go to nearest ER at once. Patient agrees to notify close family/friend of new trial of antidepressants/info above. Pt.verbalizes understanding and consents to treatment with this medication.      Quality Measures:   Former smoker    I advised Amy Silva of the risks of tobacco use.       Follow Up:   Return in about 6 weeks (around 5/13/2025).    Patient or patient representative verbalized consent for the use of Ambient Listening during the visit with  NABIL Arias for chart documentation. 4/1/2025  07:55 EDT    NABIL Arias, PMHNP-[facility] [address]

## 2025-04-28 ENCOUNTER — TELEPHONE (OUTPATIENT)
Dept: FAMILY MEDICINE CLINIC | Facility: CLINIC | Age: 30
End: 2025-04-28
Payer: COMMERCIAL

## 2025-04-29 NOTE — TELEPHONE ENCOUNTER
Pt needs to see me in the office for assessment before I can place orders. Please get him an appt with me this week.

## 2025-04-29 NOTE — TELEPHONE ENCOUNTER
Notified Layduh that patient needs to make a follow up appointment for documentation for any orders to be placed

## 2025-05-05 DIAGNOSIS — F90.0 ATTENTION DEFICIT HYPERACTIVITY DISORDER (ADHD), INATTENTIVE TYPE, MODERATE: ICD-10-CM

## 2025-05-05 RX ORDER — DEXTROAMPHETAMINE SACCHARATE, AMPHETAMINE ASPARTATE MONOHYDRATE, DEXTROAMPHETAMINE SULFATE AND AMPHETAMINE SULFATE 5; 5; 5; 5 MG/1; MG/1; MG/1; MG/1
40 CAPSULE, EXTENDED RELEASE ORAL DAILY
Qty: 60 CAPSULE | Refills: 0 | Status: SHIPPED | OUTPATIENT
Start: 2025-05-05 | End: 2026-05-05

## 2025-05-05 NOTE — TELEPHONE ENCOUNTER
Rx Refill Note  Requested Prescriptions     Pending Prescriptions Disp Refills    amphetamine-dextroamphetamine XR (ADDERALL XR) 20 MG 24 hr capsule 60 capsule 0     Sig: Take 2 capsules by mouth Daily      Last office visit with prescribing clinician: 3/4/2025   Last telemedicine visit with prescribing clinician: Visit date not found   Next office visit with prescribing clinician: 5/15/2025                         Would you like a call back once the refill request has been completed: [] Yes [x] No    If the office needs to give you a call back, can they leave a voicemail: [x] Yes [] No    Jessica Barnard MA  05/05/25, 09:46 EDT

## 2025-05-15 ENCOUNTER — TELEMEDICINE (OUTPATIENT)
Age: 30
End: 2025-05-15
Payer: COMMERCIAL

## 2025-05-15 DIAGNOSIS — F33.0 MILD EPISODE OF RECURRENT MAJOR DEPRESSIVE DISORDER: ICD-10-CM

## 2025-05-15 DIAGNOSIS — F41.9 ANXIETY: ICD-10-CM

## 2025-05-15 DIAGNOSIS — F90.0 ATTENTION DEFICIT HYPERACTIVITY DISORDER (ADHD), INATTENTIVE TYPE, MODERATE: Primary | ICD-10-CM

## 2025-05-15 NOTE — PROGRESS NOTES
Video Visit      Patient Name: Amy Silva  : 1995   MRN: [id number]     Referring Provider: Kinga Dye PA-C    Chief Complaint:      ICD-10-CM ICD-9-CM   1. Attention deficit hyperactivity disorder (ADHD), inattentive type, moderate  F90.0 314.01   2. Anxiety  F41.9 300.00   3. Mild episode of recurrent major depressive disorder  F33.0 296.31          History of Present Illness:   Amy Silva is a 29 y.o. male who is being seen by a video visit today for follow up and medication management.       Arrived 8:08 to 8:15 appointment    Subjective   Patient Reports:   History of Present Illness  He has been grappling with external stressors since 2025, which have significantly impacted his mental health, leading to symptoms of anxiety and depression. These stressors are trauma-related and have caused a state of mental disarray. Over the past two weeks, he has managed to maintain some degree of focus, but the severity of his situation has led to increased irritability and heightened anxiety. He reports his mind has been all over the place.     His physical activity has been limited due to a right arm injury sustained a month ago, preventing him from engaging in weightlifting exercises that he believes would be beneficial. Despite this, he has been attending the gym for cardio workouts over the past 1.5 weeks.    His sleep and appetite remain largely unaffected. He reports no suicidal ideation or self-harm tendencies but admits to feelings of hopelessness and loneliness. He expresses a preference for initiating therapy prior to any potential adjustments in his Zoloft dosage. He denies SI/HI/AVH    MEDICATIONS  Adderall, Zoloft      Review of Systems:   Review of Systems   Constitutional:  Negative for appetite change, chills, diaphoresis, fatigue, fever and unexpected weight change.   HENT:  Negative for congestion and sore throat.    Eyes:  Negative for visual disturbance.   Respiratory:   Negative for cough, chest tightness and shortness of breath.    Cardiovascular:  Negative for chest pain.   Gastrointestinal:  Negative for abdominal pain, nausea and vomiting.   Genitourinary:  Negative for dysuria.   Musculoskeletal:  Negative for gait problem, myalgias and neck pain.   Skin:  Negative for rash and wound.   Neurological:  Negative for dizziness, tremors, seizures, weakness, light-headedness, numbness and headaches.   Psychiatric/Behavioral:  Positive for dysphoric mood. Negative for agitation, behavioral problems, confusion, decreased concentration, hallucinations, self-injury, sleep disturbance and suicidal ideas. The patient is nervous/anxious. The patient is not hyperactive.    Sleep pattern: 6-8 hours per night   Appetite: normal       RISK ASSESSMENT:  Patient denies any thoughts of suicide or intent today. Patient denies any suicidal or homicidal ideation today. Patient denies any high risk factors today.     Medications:     Current Outpatient Medications:     amoxicillin (AMOXIL) 875 MG tablet, Take 1 tablet by mouth 2 (Two) Times a Day., Disp: 14 tablet, Rfl: 0    amphetamine-dextroamphetamine XR (ADDERALL XR) 20 MG 24 hr capsule, Take 2 capsules by mouth Daily, Disp: 60 capsule, Rfl: 0    doxazosin (CARDURA) 1 MG tablet, Take 1 tablet by mouth Every Night., Disp: 90 tablet, Rfl: 1    guaiFENesin (Mucinex) 600 MG 12 hr tablet, Take 2 tablets by mouth 2 (Two) Times a Day. For congestion, Disp: 40 tablet, Rfl: 0    NIFEdipine XL (PROCARDIA XL) 90 MG 24 hr tablet, Take 1 tablet by mouth Daily. for blood pressure, Disp: 90 tablet, Rfl: 1    sertraline (Zoloft) 50 MG tablet, Take 1 tablet by mouth Daily. For depression and anxiety, Disp: 90 tablet, Rfl: 3    valACYclovir (Valtrex) 500 MG tablet, Take 1 tablet by mouth 2 (Two) Times a Day. As needed for outbreaks, Disp: 10 tablet, Rfl: 11    Medication Considerations:  DAVE reviewed and appropriate.      Allergies:   No Known  "Allergies    Objective     Physical Exam:  Vital Signs: There were no vitals filed for this visit.  There is no height or weight on file to calculate BMI.   The patient was seen remotely today via a MyChart Video Visit through [facility].  Unable to obtain vital signs due to nature of remote visit.  Height stated at 78 inches.  Weight stated at 354 pounds.     Mental Status Exam:   MENTAL STATUS EXAM   General Appearance:  Cleanly groomed and dressed and well developed  Eye Contact:  Good eye contact  Attitude:  Cooperative and polite  Motor Activity:  Other  Other Comment:  VINOD Vidoe Visit  Muscle Strength:  Other  Other Comment:  VINOD Vidoe Visit  Speech:  Normal rate, tone, volume  Language:  Spontaneous  Mood and affect:  Anxious, depressed and other  Other Comment:  Stressed  Hopelessness:  2  Loneliness: 2  Thought Process:  Logical  Associations/ Thought Content:  No delusions  Hallucinations:  None  Suicidal Ideations:  Not present  Homicidal Ideation:  Not present  Sensorium:  Alert and clear  Orientation:  Person, place, time and situation  Immediate Recall, Recent, and Remote Memory:  Intact  Attention Span/ Concentration:  Good  Fund of Knowledge:  Appropriate for age and educational level  Intellectual Functioning:  Average range  Insight:  Good  Judgement:  Good  Reliability:  Good  Impulse Control:  Fair       @RESULASTCBCDIFFPANEL,TSH,LABLIPI,[id number],[id number],MG,FOLATE,PROLACTIN,CRPRESULT,CMP,[id number])@    Lab Results   Component Value Date    GLUCOSE 100 (H) 04/21/2019    BUN 9 04/21/2019    CREATININE 1.16 04/21/2019    BCR 7.8 04/21/2019    K 4.0 04/21/2019    CO2 25.0 04/21/2019    CALCIUM 9.4 04/21/2019    ALBUMIN 4.00 04/21/2019    BILITOT 0.4 04/21/2019    AST 24 04/21/2019    ALT 27 04/21/2019       Lab Results   Component Value Date    WBC 10.23 04/24/2025    HGB 13.2 (L) 04/24/2025    HCT 38.4 (L) 04/24/2025    MCV 83 04/24/2025     (H) 04/24/2025       No results found for: \"CHOL\", " "\"CHLPL\", \"TRIG\", \"HDL\", \"LDL\"    Assessment / Plan      Visit Diagnosis/Orders Placed This Visit:  Diagnoses and all orders for this visit:    1. Attention deficit hyperactivity disorder (ADHD), inattentive type, moderate (Primary)    2. Anxiety  -     Ambulatory Referral to Behavioral Health    3. Mild episode of recurrent major depressive disorder  -     Ambulatory Referral to Behavioral Health         Assessment & Plan  1. Anxiety.  He reports increased anxiety over the past month, likely exacerbated by external stressors and trauma. He has been unable to engage in specific physical activities due to a right arm injury. He is currently on Zoloft 50 mg, which he finds helpful. A referral to a therapist will be provided to address his anxiety and trauma-related issues. He is advised to continue with his current medication regimen. If his symptoms worsen or become unmanageable, he should contact the office for potential medication adjustments.    2. Depression.  He reports feelings of hopelessness and loneliness but no suicidal ideation or self-harm. He is currently on Zoloft 50 mg, which he finds helpful. A referral to a therapist will be provided to address his depression and trauma-related issues. He is advised to continue with his current medication regimen. If his symptoms worsen or become unmanageable, he should contact the office for potential medication adjustments.    3. Attention deficit hyperactivity disorder.  He is advised to continue with his current Adderall regimen. No changes to his ADHD medication are recommended at this time.      Functional Status: Mild impairment     Prognosis: Good with Ongoing Treatment     Impression/Formulation:  Patient appeared alert and oriented.  Patient is voluntarily requesting to continue outpatient psychiatric treatment at [facility].  Patient is receptive to assistance with maintaining a stable lifestyle.  Patient presents with history " of     ICD-10-CM ICD-9-CM   1. Attention deficit hyperactivity disorder (ADHD), inattentive type, moderate  F90.0 314.01   2. Anxiety  F41.9 300.00   3. Mild episode of recurrent major depressive disorder  F33.0 296.31   . Reviewed patient's previous provider notes. Reviewed most recent labs. Patient meets DSM V diagnostic criteria for diagnoses. Diagnoses may be updated as more information becomes available.     Treatment Plan:   Continue Zoloft 50 mg PO qd and Adderall XR to 40mg PO qam. No refills needed  Encouraged psychotherapy  Follow up in 6 weeks and as needed    Patient will continue supportive psychotherapy efforts and medications as indicated. Clinic will obtain release of information for current treatment team for continuity of care as needed. Patient will contact this office, call 911 or present to the nearest emergency room should suicidal or homicidal ideations occur. Discussed medication options and treatment plan of prescribed medication(s) as well as the risks, benefits, and potential side effects. Patient ackowledged and verbally consented to continue with current treatment plan and was educated on the importance of compliance with treatment and follow-up appointments.     Medication options for treatment of ADHD discussed including stimulant and non-stimulant options. Extensive education is provided regarding risks associated with stimulant use including but not limited to:, insomnia, headache, exacerbation of tics, nervousness, irritability, overstimulation, tremor, dizziness, anorexia or change in appetite, nausea, dry mouth, constipation, diarrhea, weight loss, sexual dysfunction, psychotic episodes, seizures, palpitations, tachycardia, hypertension, rare activation (activation of hypomania, efren, and/or suicidal ideations), cardiovascular adverse effects including sudden death. Patient denies any personal or family history of seizures or structural cardiac abnormalities.     Controlled  substance agreement reviewed and signed by patient, Patient  is  informed that the medication is to be used by the patient only, the medication is to be used only as directed, and the medication should not be combined with other substances unless directed by a Provider/Prescriber. I advised patients to avoid taking  medication with alcohol or illicit/unprescribed substances., including THC. Patient understands that habitual use of THC while being prescribed a stimulant will result in provider discontinuing stimulant medication due to the cognition dulling effects of marijuana negating the cognitive enhancing action of the stimulant. The patient verbalizes understanding and agreement with this in their own words, and wishes to pursue proposed treatment plan.     Medication risks and side effects discussed with patient including risk for worsening mood, changes in behavior, thoughts of suicide or homicide, induction of efren, serotonin syndrome, rare hyponatremia, rare SIADH, withdrawal symptoms if abrupt withdrawal, sexual dysfunction.   If any thoughts of SI or HI, worsening mood or changes in behavior, call 911 or crisis line [phone], or go to nearest ER at once. Patient agrees to notify close family/friend of new trial of antidepressants/info above. Pt.verbalizes understanding and consents to treatment with this medication.     Quality Measures:   Former smoker    I advised Amy Silva of the risks of tobacco use.       Follow Up:   Return in about 6 weeks (around 6/26/2025).    Patient or patient representative verbalized consent for the use of Ambient Listening during the visit with  NABIL Arias for chart documentation. 5/15/2025  07:58 EDT    NABIL Arias, [facility] [address]    No heavy lifting/Nothing per vagina

## 2025-07-01 DIAGNOSIS — F90.0 ATTENTION DEFICIT HYPERACTIVITY DISORDER (ADHD), INATTENTIVE TYPE, MODERATE: ICD-10-CM

## 2025-07-01 RX ORDER — DEXTROAMPHETAMINE SACCHARATE, AMPHETAMINE ASPARTATE MONOHYDRATE, DEXTROAMPHETAMINE SULFATE AND AMPHETAMINE SULFATE 5; 5; 5; 5 MG/1; MG/1; MG/1; MG/1
40 CAPSULE, EXTENDED RELEASE ORAL DAILY
Qty: 60 CAPSULE | Refills: 0 | Status: SHIPPED | OUTPATIENT
Start: 2025-07-01 | End: 2026-07-01

## 2025-07-01 NOTE — TELEPHONE ENCOUNTER
Rx Refill Note  Requested Prescriptions     Pending Prescriptions Disp Refills    amphetamine-dextroamphetamine XR (ADDERALL XR) 20 MG 24 hr capsule 60 capsule 0     Sig: Take 2 capsules by mouth Daily      Last office visit with prescribing clinician: 3/4/2025   Last telemedicine visit with prescribing clinician: Visit date not found   Next office visit with prescribing clinician: 8/26/2025                         Would you like a call back once the refill request has been completed: [] Yes [] No    If the office needs to give you a call back, can they leave a voicemail: [] Yes [] No    Danuta Ledezma MA  07/01/25, 07:57 EDT